# Patient Record
Sex: MALE | Race: WHITE | Employment: OTHER | URBAN - METROPOLITAN AREA
[De-identification: names, ages, dates, MRNs, and addresses within clinical notes are randomized per-mention and may not be internally consistent; named-entity substitution may affect disease eponyms.]

---

## 2017-07-05 ENCOUNTER — OFFICE VISIT (OUTPATIENT)
Dept: FAMILY MEDICINE | Facility: CLINIC | Age: 62
End: 2017-07-05

## 2017-07-05 VITALS
DIASTOLIC BLOOD PRESSURE: 71 MMHG | TEMPERATURE: 97.9 F | HEIGHT: 70 IN | OXYGEN SATURATION: 98 % | SYSTOLIC BLOOD PRESSURE: 134 MMHG | HEART RATE: 71 BPM | WEIGHT: 159.2 LBS | BODY MASS INDEX: 22.79 KG/M2
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
## 2017-07-05 DIAGNOSIS — S40.862A TICK BITE OF LEFT UPPER ARM, INITIAL ENCOUNTER: Primary | ICD-10-CM

## 2017-07-05 PROCEDURE — 36415 COLL VENOUS BLD VENIPUNCTURE: CPT | Performed by: FAMILY MEDICINE

## 2017-07-05 PROCEDURE — 99213 OFFICE O/P EST LOW 20 MIN: CPT | Performed by: FAMILY MEDICINE

## 2017-07-05 PROCEDURE — 86618 LYME DISEASE ANTIBODY: CPT | Performed by: FAMILY MEDICINE

## 2017-07-05 RX ORDER — DOXYCYCLINE 100 MG/1
200 CAPSULE ORAL ONCE
Qty: 2 CAPSULE | Refills: 0 | Status: SHIPPED | OUTPATIENT
Start: 2017-07-05 | End: 2017-07-05

## 2017-07-05 NOTE — PATIENT INSTRUCTIONS
"  Tick Bites  Ticks are insects that feed on the blood of animals and humans. They may gorge themselves for days before you find and remove them. The bites themselves aren't cause for concern. But ticks can carry and transmit illnesses such as Lyme disease and [place]qui[place] spotted fever. Both diseases begin with a rash and symptoms similar to the flu. In advanced stages, these diseases can be quite serious.     A \"bull's eye\" rash is a common symptom of Lyme disease.   When to go to the emergency department (ED)  Not all ticks carry disease. And a tick must remain attached for at least 24 hours to infect you. If you find a tick, don't panic. Try to carefully remove it with tweezers. Grasp the insect near its head and pull without twisting. If you can't easily dislodge the tick or if you leave the head in your skin, get medical care right away.  What to expect in the ED    The tick or any remnants will be removed and the bite cleaned.    To prevent disease, you may be given antibiotics. Both Lyme disease and [place]qui[place] spotted fever respond quickly to these medications.    You may be asked to see your health care provider for a blood test to check for Lyme disease.  Follow-up care  Some states and counties have services that test ticks for the presence of Lyme's disease and other diseases. You may check with your local officials to see if this service is available in your area.  If you remove a tick yourself, watch for signs of a tick-borne illness. Symptoms may appear within a few days or weeks after a bite. Call your health care provider if you notice any of the following:    Rash (This may spread outward in a ring from a hard white lump. Or, it may move up your arms and legs to your chest.)    Chills and fever    Body aches and joint pain    Severe headache  Date Last Reviewed: 12/1/2016 2000-2017 [employer]. [address] [place] [address], [place] [address]. All rights reserved. This information " is not intended as a substitute for professional medical care. Always follow your healthcare professional's instructions.

## 2017-07-05 NOTE — LETTER
[facility] [place]  [address] [facility]  [place] [address]  [phone]        July 11, 2017      Gera Garsia  [address]  [place] [address]        Dear Gera,      Lymes test came back negative.     Results for orders placed or performed in visit on 07/05/17   Lyme Disease Krystin with reflex to WB Serum   Result Value Ref Range    Lyme Disease Antibodies Serum 0.06 0.00 - 0.89       If you have any questions or concerns, please call myself or my nurse at [phone].    Sincerely,      Anel Erickson M.D/karlo

## 2017-07-05 NOTE — NURSING NOTE
"Chief Complaint   Patient presents with     Insect Bites       Initial /71  Pulse 71  Temp 97.9  F (36.6  C) (Tympanic)  Ht 5' 10\" (1.778 m)  Wt 159 lb 3.2 oz (72.2 kg)  SpO2 98%  BMI 22.84 kg/m2 Estimated body mass index is 22.84 kg/(m^2) as calculated from the following:    Height as of this encounter: 5' 10\" (1.778 m).    Weight as of this encounter: 159 lb 3.2 oz (72.2 kg).  Medication Reconciliation: complete     Health Maintenance:  Declined all.  Declined having referral/ order placed for colonoscopy or FIT test.     Lucy Medina MA      "

## 2017-07-05 NOTE — MR AVS SNAPSHOT
"              After Visit Summary   7/5/2017    Gera Garsia    MRN: [id number]           Patient Information     Date Of Birth          1955        Visit Information        Provider Department      7/5/2017 10:00 AM Anel Erickson MD [facility]        Today's Diagnoses     Tick bite of left upper arm, initial encounter    -  1      Care Instructions      Tick Bites  Ticks are insects that feed on the blood of animals and humans. They may gorge themselves for days before you find and remove them. The bites themselves aren't cause for concern. But ticks can carry and transmit illnesses such as Lyme disease and [place]qui[place] spotted fever. Both diseases begin with a rash and symptoms similar to the flu. In advanced stages, these diseases can be quite serious.     A \"bull's eye\" rash is a common symptom of Lyme disease.   When to go to the emergency department (ED)  Not all ticks carry disease. And a tick must remain attached for at least 24 hours to infect you. If you find a tick, don't panic. Try to carefully remove it with tweezers. Grasp the insect near its head and pull without twisting. If you can't easily dislodge the tick or if you leave the head in your skin, get medical care right away.  What to expect in the ED    The tick or any remnants will be removed and the bite cleaned.    To prevent disease, you may be given antibiotics. Both Lyme disease and [place] spotted fever respond quickly to these medications.    You may be asked to see your health care provider for a blood test to check for Lyme disease.  Follow-up care  Some states and [facility] have services that test ticks for the presence of Lyme's disease and other diseases. You may check with your local officials to see if this service is available in your area.  If you remove a tick yourself, watch for signs of a tick-borne illness. Symptoms may appear within a few days or weeks after a bite. Call your health care " "provider if you notice any of the following:    Rash (This may spread outward in a ring from a hard white lump. Or, it may move up your arms and legs to your chest.)    Chills and fever    Body aches and joint pain    Severe headache  Date Last Reviewed: 12/1/2016 2000-2017 [employer]. [address] [place] [address]. All rights reserved. This information is not intended as a substitute for professional medical care. Always follow your healthcare professional's instructions.                Follow-ups after your visit        Follow-up notes from your care team     Return if symptoms worsen or fail to improve.      Who to contact     Normal or non-critical lab and imaging results will be communicated to you by The[employer]orhart, letter or phone within 4 business days after the clinic has received the results. If you do not hear from us within 7 days, please contact the clinic through [employer] or phone. If you have a critical or abnormal lab result, we will notify you by phone as soon as possible.  Submit refill requests through [employer] or call your pharmacy and they will forward the refill request to us. Please allow 3 business days for your refill to be completed.          If you need to speak with a  for additional information , please call: [phone]             Additional Information About Your Visit        [employer] Information     [employer] lets you send messages to your doctor, view your test results, renew your prescriptions, schedule appointments and more. To sign up, go to [email or web address] . Click on \"Log in\" on the left side of the screen, which will take you to the Welcome page. Then click on \"Sign up Now\" on the right side of the page.     You will be asked to enter the access code listed below, as well as some personal information. Please follow the directions to create your username and password.     Your access code is: [email or web address]  Expires: 10/3/2017 10:26 AM   " "  Your access code will  in 90 days. If you need help or a new code, please call your [facility] clinic or [phone].        Care EveryWhere ID     This is your Care EveryWhere ID. This could be used by other organizations to access your [facility] medical records  [id number]        Your Vitals Were     Pulse Temperature Height Pulse Oximetry BMI (Body Mass Index)       71 97.9  F (36.6  C) (Tympanic) 5' 10\" (1.778 m) 98% 22.84 kg/m2        Blood Pressure from Last 3 Encounters:   17 134/71   16 132/62   16 132/81    Weight from Last 3 Encounters:   17 159 lb 3.2 oz (72.2 kg)   16 173 lb (78.5 kg)   16 170 lb 12.8 oz (77.5 kg)              We Performed the Following     Lyme Disease Krystin with reflex to WB Serum          Today's Medication Changes          These changes are accurate as of: 17 10:26 AM.  If you have any questions, ask your nurse or doctor.               Start taking these medicines.        Dose/Directions    doxycycline 100 MG capsule   Commonly known as:  VIBRAMYCIN   Used for:  Tick bite of left upper arm, initial encounter   Started by:  Anel Erickson MD        Dose:  200 mg   Take 2 capsules (200 mg) by mouth once for 1 dose   Quantity:  2 capsule   Refills:  0            Where to get your medicines      These medications were sent to [facility] [id number] [place] [address] [address] [facility] [address]    Hours:  Test fax successful 02   Phone:  [phone]     doxycycline 100 MG capsule                Primary Care Provider Office Phone #    [facility] [phone]       No address on file        Equal Access to Services     ALEXANDR BRIDGES AH: Fran Talavera, wasanjuanada lubarry, qaybta kaalmada riki, keyana valdez. So [facility] [phone].    ATENCIÓN: Si habla español, tiene a ruby disposición servicios gratuitos de " asistencia lingüística. Chip al [phone].    We comply with applicable federal civil rights laws and [place] laws. We do not discriminate on the basis of race, color, national origin, age, disability sex, sexual orientation or gender identity.            Thank you!     Thank you for choosing [facility]  for your care. Our goal is always to provide you with excellent care. Hearing back from our patients is one way we can continue to improve our services. Please take a few minutes to complete the written survey that you may receive in the mail after your visit with us. Thank you!             Your Updated Medication List - Protect others around you: Learn how to safely use, store and throw away your medicines at www.disposemymeds.org.          This list is accurate as of: 7/5/17 10:26 AM.  Always use your most recent med list.                   Brand Name Dispense Instructions for use Diagnosis    doxycycline 100 MG capsule    VIBRAMYCIN    2 capsule    Take 2 capsules (200 mg) by mouth once for 1 dose    Tick bite of left upper arm, initial encounter

## 2017-07-05 NOTE — PROGRESS NOTES
SUBJECTIVE:                                                    Gera Garsia is a 62 year old male who presents to clinic today for the following health issues:      Possible Deer Tick Bite  Red ana on left upper extremity.  Increasing in size since first noticed x6 days ago.       Denies having any symptoms. No fever/chills. No malaise or joint pain.   Concerned about possible lyme disease. States that he [place] in the woods and has a friend that had a similar tick bite and ended up with disseminated lyme disease.     Problem list and histories reviewed & adjusted, as indicated.  Additional history: as documented    Patient Active Problem List   Diagnosis     Colon cancer screening     Hyperlipidemia with target LDL less than 130     Advanced directives, counseling/discussion     Baker's cyst of knee     RLQ abdominal pain     Asplenia     Past Surgical History:   Procedure Laterality Date     ABDOMEN SURGERY      x 2, once as a , and once for spenectomy      ENT SURGERY       ORTHOPEDIC SURGERY      AC joint separation repair- complicated with infection       Social History   Substance Use Topics     Smoking status: Former Smoker     Packs/day: 1.00     Years: 13.00     Quit date: 1984     Smokeless tobacco: Never Used     Alcohol use No      Comment: Formerly beer 5-6 beers/day, quit 10 yrs ago ()     Family History   Problem Relation Age of Onset     Coronary Artery Disease Father 67      from MI, smoker, drinker, obesity     Coronary Artery Disease Paternal Uncle      Hypertension Mother      Hyperlipidemia Father      Hyperlipidemia Mother      Breast Cancer No family hx of      Cancer - colorectal No family hx of      Ovarian Cancer No family hx of      Prostate Cancer No family hx of          Current Outpatient Prescriptions   Medication Sig Dispense Refill     doxycycline (VIBRAMYCIN) 100 MG capsule Take 2 capsules (200 mg) by mouth once for 1 dose 2 capsule 0     No Known  "Allergies    Reviewed and updated as needed this visit by clinical staff  Allergies       Reviewed and updated as needed this visit by Provider         ROS:  Constitutional, HEENT, cardiovascular, pulmonary, gi and gu systems are negative, except as otherwise noted.    OBJECTIVE:     /71  Pulse 71  Temp 97.9  F (36.6  C) (Tympanic)  Ht 5' 10\" (1.778 m)  Wt 159 lb 3.2 oz (72.2 kg)  SpO2 98%  BMI 22.84 kg/m2  Body mass index is 22.84 kg/(m^2).  GENERAL: healthy, alert and no distress  SKIN: Mild erythematous flat circular area measuring about 3 cm without central clearing on the left mid upper arm. No bite marks or edema. Non-tender to palpation     Diagnostic Test Results:  Labs in process    ASSESSMENT/PLAN:     Gera was seen today for insect bites.    Diagnoses and all orders for this visit:    Tick bite of left upper arm, initial encounter; with local reaction  -     Lyme Disease Krystin with reflex to WB Serum  -     doxycycline (VIBRAMYCIN) 100 MG capsule; Take 2 capsules (200 mg) by mouth once for 1 dose       Follow up if symptoms fail to improve or worsen.      The patient was in agreement with the plan today and had no questions or concerns prior to leaving the clinic.        Anel Erickson MD  [facility]          "

## 2017-07-07 LAB — B BURGDOR IGG+IGM SER QL: 0.06 (ref 0–0.89)

## 2017-12-21 ENCOUNTER — OFFICE VISIT (OUTPATIENT)
Dept: URGENT CARE | Facility: URGENT CARE | Age: 62
End: 2017-12-21

## 2017-12-21 ENCOUNTER — NURSE TRIAGE (OUTPATIENT)
Dept: NURSING | Facility: CLINIC | Age: 62
End: 2017-12-21

## 2017-12-21 VITALS
DIASTOLIC BLOOD PRESSURE: 84 MMHG | HEART RATE: 88 BPM | BODY MASS INDEX: 23.59 KG/M2 | OXYGEN SATURATION: 99 % | SYSTOLIC BLOOD PRESSURE: 150 MMHG | TEMPERATURE: 98.6 F | WEIGHT: 164.4 LBS

## 2017-12-21 DIAGNOSIS — H53.8 BLURRY VISION, LEFT EYE: Primary | ICD-10-CM

## 2017-12-21 PROCEDURE — 99213 OFFICE O/P EST LOW 20 MIN: CPT | Performed by: NURSE PRACTITIONER

## 2017-12-21 NOTE — MR AVS SNAPSHOT
After Visit Summary   12/21/2017    Gera Garsia    MRN: [id number]           Patient Information     Date Of Birth          1955        Visit Information        Provider Department      12/21/2017 8:10 PM Roseanna Mart NP [facility]        Today's Diagnoses     Blurry vision, left eye    -  1      Care Instructions      Blurred Vision  Blurred vision is when your vision is no longer sharp and you can t see small details. Any changes in your vision, whether sudden or over time, should be checked out by an eye specialist.  Vision changes can be caused by many things. These include eye diseases, side effects of some medicines, or a condition such as diabetes. Never ignore vision changes. People often think that vision changes occur because they need more powerful vision correction. Many people then delay seeing their healthcare provider about their vision changes. But it s risky to delay care. If left untreated, some eye problems can lead to lasting (permanent) vision loss that can t be corrected with glasses. This can significantly reduce quality of life.  Home care  Make changes in your home to reduce the risk of falling:    Keep walkways clear of objects you may trip over. Use nonslip pads under rugs.    Brighter lighting in your home may help you see better.    Don t walk in poorly lit areas.    Be careful when stepping up and down from curbs and walking on uneven sidewalks.  Follow-up care  Follow up with an eye specialist or as advised. There are two types of eye doctors you can consult:    Optometrist. This is a licensed doctor of optometry. Optometrists are not medical doctors. They specialize in eye exams and may diagnose some eye problems. They also prescribe glasses and contact lenses.    Ophthalmologist. This is a medical doctor who specializes in eye care. Ophthalmologists diagnose and treat all eye diseases, prescribe medicines, and perform eye surgery. They may  also prescribe glasses and contact lenses.  An optometrist can provide a basic screening eye exam for much less cost than an ophthalmologist. The optometrist can tell you if your condition needs the services of an ophthalmologist.  When to seek medical advice  Call your healthcare provider right away if any of these occur:    Sudden change in your vision    Eye pain, redness, or discharge from your eyelid    Blurriness doesn t get better, or it gets worse    Dark spots in your field of vision    Halos around lights    Dots or strings (called floaters) moving across your field of vision    Sudden flash of light inside your eye    Vision dims    Part or full vision loss  Date Last Reviewed: 7/1/2017 2000-2017 [employer]. [address] [place] [address], [place] [address]. All rights reserved. This information is not intended as a substitute for professional medical care. Always follow your healthcare professional's instructions.                Follow-ups after your visit        Additional Services     OPHTHALMOLOGY ADULT REFERRAL       Your provider has referred you to: [facility]: [facility] [phone]   [email or web address]    Please be aware that coverage of these services is subject to the terms and limitations of your health insurance plan.  Call member services at your health plan with any benefit or coverage questions.      Please bring the following with you to your appointment:    (1) Any X-Rays, CTs or MRIs which have been performed.  Contact the facility where they were done to arrange for  prior to your scheduled appointment.    (2) List of current medications  (3) This referral request   (4) Any documents/labs given to you for this referral                  Who to contact     If you have questions or need follow up information about today's clinic visit or your schedule please contact [facility] directly at [phone].  Normal  "or non-critical lab and imaging results will be communicated to you by MyChart, letter or phone within 4 business days after the clinic has received the results. If you do not hear from us within 7 days, please contact the clinic through Copybart or phone. If you have a critical or abnormal lab result, we will notify you by phone as soon as possible.  Submit refill requests through [employer]a or call your pharmacy and they will forward the refill request to us. Please allow 3 business days for your refill to be completed.          Additional Information About Your Visit        [employer] Mobile Guides Information     [employer] lets you send messages to your doctor, view your test results, renew your prescriptions, schedule appointments and more. To sign up, go to [email or web address] . Click on \"Log in\" on the left side of the screen, which will take you to the Welcome page. Then click on \"Sign up Now\" on the right side of the page.     You will be asked to enter the access code listed below, as well as some personal information. Please follow the directions to create your username and password.     Your access code is: [email or web address]  Expires: 3/21/2018  8:40 PM     Your access code will  in 90 days. If you need help or a new code, please call your [facility] clinic or [phone].        Care EveryWhere ID     This is your Care EveryWhere ID. This could be used by other organizations to access your [facility] medical records  [id number]        Your Vitals Were     Pulse Temperature Pulse Oximetry BMI (Body Mass Index)          88 98.6  F (37  C) (Oral) 99% 23.59 kg/m2         Blood Pressure from Last 3 Encounters:   17 150/84   17 134/71   16 132/62    Weight from Last 3 Encounters:   17 164 lb 6.4 oz (74.6 kg)   17 159 lb 3.2 oz (72.2 kg)   16 173 lb (78.5 kg)              We Performed the Following     OPHTHALMOLOGY ADULT REFERRAL        Primary Care Provider Office Phone # Fax #    Sheng " [facility] [phone]       [address] [facility] [address]        Equal Access to Services     ALEXANDR BRIDGES : Hadii aad ku hadalcidespatricio Somoraima, wasanjuanada luqadaha, cathleenta kaalmada riki, keyana martinezjonny valdez. So [facility] [phone].    ATENCIÓN: Si habla español, tiene a ruby disposición servicios gratuitos de asistencia lingüística. Llame al [phone].    We comply with applicable federal civil rights laws and [place] laws. We do not discriminate on the basis of race, color, national origin, age, disability, sex, sexual orientation, or gender identity.            Thank you!     Thank you for choosing [facility]  for your care. Our goal is always to provide you with excellent care. Hearing back from our patients is one way we can continue to improve our services. Please take a few minutes to complete the written survey that you may receive in the mail after your visit with us. Thank you!             Your Updated Medication List - Protect others around you: Learn how to safely use, store and throw away your medicines at www.disposemymeds.org.      Notice  As of 12/21/2017  8:40 PM    You have not been prescribed any medications.

## 2017-12-22 ENCOUNTER — OFFICE VISIT (OUTPATIENT)
Dept: OPHTHALMOLOGY | Facility: CLINIC | Age: 62
End: 2017-12-22

## 2017-12-22 DIAGNOSIS — H43.812 POSTERIOR VITREOUS DETACHMENT OF LEFT EYE: ICD-10-CM

## 2017-12-22 DIAGNOSIS — H43.12 VITREOUS HEMORRHAGE OF LEFT EYE (H): Primary | ICD-10-CM

## 2017-12-22 DIAGNOSIS — H52.4 PRESBYOPIA: ICD-10-CM

## 2017-12-22 PROCEDURE — 92004 COMPRE OPH EXAM NEW PT 1/>: CPT | Performed by: OPHTHALMOLOGY

## 2017-12-22 PROCEDURE — 92015 DETERMINE REFRACTIVE STATE: CPT | Performed by: OPHTHALMOLOGY

## 2017-12-22 RX ORDER — CHLORAL HYDRATE 500 MG
CAPSULE ORAL
COMMUNITY
Start: 2010-09-30

## 2017-12-22 ASSESSMENT — REFRACTION_MANIFEST
OD_ADD: +2.50
OS_CYLINDER: SPHERE
OD_AXIS: 020
OS_ADD: +2.50
OS_SPHERE: -0.50
OD_SPHERE: -1.00
OD_CYLINDER: +0.75

## 2017-12-22 ASSESSMENT — CONF VISUAL FIELD: OD_NORMAL: 1

## 2017-12-22 ASSESSMENT — SLIT LAMP EXAM - LIDS
COMMENTS: 1+ DERMATOCHALASIS, 1+ MEIBOMIAN GLAND DYSFUNCTION
COMMENTS: 1+ DERMATOCHALASIS, 1+ MEIBOMIAN GLAND DYSFUNCTION

## 2017-12-22 ASSESSMENT — TONOMETRY
OS_IOP_MMHG: 16
OD_IOP_MMHG: 16
IOP_METHOD: APPLANATION

## 2017-12-22 ASSESSMENT — VISUAL ACUITY
OD_SC+: -2
METHOD: SNELLEN - LINEAR
OD_SC: 20/30
OD_PH_SC: 20/30-1
OS_SC+: -2
OS_SC: 20/60

## 2017-12-22 ASSESSMENT — CUP TO DISC RATIO
OD_RATIO: 0.6
OS_RATIO: 0.4

## 2017-12-22 NOTE — PROGRESS NOTES
Current Eye Medications:  Art. Tears once yesterday.      Subjective:  Here for blurred vision left eye, started two days ago. Hasn't had a complete exam in 5 years or so.  In Epic, said he was open angle suspect in 2006 at [facility].  He is unaware of this.  Blood pressure was high yesterday when he was seen in Urgent Care.  Noticed as he was sitting watching TV 2 days ago, that the vision left eye got cloudy, then smoke.  A shadow of black that moves around in the center of the vision.      Objective:  See Ophthalmology Exam.       Assessment:  Acute vitreous hemorrhage left eye likely from posterior vitreous detachment.  No retinal tear or detachment at this time.      Plan:  Continue observation  Signs and symptoms of retinal detachment (shower of black floaters, frequent flashing even during day, curtain over part of visual field) discussed.   Possible posterior vitreous detachment (sudden onset large floater and/or flashing lights) discussed. Right eye   Glasses Rx given - optional   Return visit 2 weeks for intraocular pressure check and dilation left eye.    Ajit Aldana M.D.

## 2017-12-22 NOTE — PATIENT INSTRUCTIONS
Continue observation  Signs and symptoms of retinal detachment (shower of black floaters, frequent flashing even during day, curtain over part of visual field) discussed.   Possible posterior vitreous detachment (sudden onset large floater and/or flashing lights) discussed. Right eye   Glasses Rx given - optional   Return visit 2 weeks for intraocular pressure check and dilation left eye.    Ajit Aldana M.D.

## 2017-12-22 NOTE — TELEPHONE ENCOUNTER
"Sudden onset of \"cloudy\" vision in the left eye started yesterday.  No injury to the eye.   Denies pain.  No drainage.  \"It's like it's smokey. A shadow rises up and a black cloud goes with it.\"    Protocol and care advice reviewed.   Patient states understanding of the recommended disposition and will be seen in ER.   Advised to call back if further questions or concerns.     Reason for Disposition    [1] Blurred vision or visual changes AND [2] present now AND [3] sudden onset or new (e.g., minutes, hours, days)  (Exception: previously diagnosed migraine headaches with same symptoms)    Additional Information    Negative: Complete loss of vision in 1 or both eyes    Negative: Severe eye pain    Negative: Severe headache    Negative: Double vision    Protocols used: VISION LOSS OR CHANGE-ADULT-AH    "

## 2017-12-22 NOTE — MR AVS SNAPSHOT
"              After Visit Summary   12/22/2017    Gera Garsia    MRN: [id number]           Patient Information     Date Of Birth          1955        Visit Information        Provider Department      12/22/2017 9:45 AM Ajit Aldana MD [facility]        Today's Diagnoses     Vitreous hemorrhage of left eye (H)    -  1    Posterior vitreous detachment of left eye        Presbyopia          Care Instructions    Continue observation  Signs and symptoms of retinal detachment (shower of black floaters, frequent flashing even during day, curtain over part of visual field) discussed.   Possible posterior vitreous detachment (sudden onset large floater and/or flashing lights) discussed. Right eye   Glasses Rx given - optional   Return visit 2 weeks for intraocular pressure check and dilation left eye.    Ajit Aldana M.D.            Follow-ups after your visit        Who to contact     If you have questions or need follow up information about today's clinic visit or your schedule please contact [facility] directly at [phone].  Normal or non-critical lab and imaging results will be communicated to you by [employer], letter or phone within 4 business days after the clinic has received the results. If you do not hear from us within 7 days, please contact the clinic through [employer] or phone. If you have a critical or abnormal lab result, we will notify you by phone as soon as possible.  Submit refill requests through [email or web address] or call your pharmacy and they will forward the refill request to us. Please allow 3 business days for your refill to be completed.          Additional Information About Your Visit        [employer] Information     [email or web address] lets you send messages to your doctor, view your test results, renew your prescriptions, schedule appointments and more. To sign up, go to [email or web address] . Click on \"Log in\" on the left side of the screen, which will take you to the " "Welcome page. Then click on \"Sign up Now\" on the right side of the page.     You will be asked to enter the access code listed below, as well as some personal information. Please follow the directions to create your username and password.     Your access code is: [email or web address]  Expires: 3/21/2018  8:40 PM     Your access code will  in 90 days. If you need help or a new code, please call your [place] clinic or [phone].        Care EveryWhere ID     This is your Care EveryWhere ID. This could be used by other organizations to access your [place] medical records  [id number]         Blood Pressure from Last 3 Encounters:   17 150/84   17 134/71   16 132/62    Weight from Last 3 Encounters:   17 74.6 kg (164 lb 6.4 oz)   17 72.2 kg (159 lb 3.2 oz)   16 78.5 kg (173 lb)              We Performed the Following     EYE EXAM (SIMPLE-NONBILLABLE)     REFRACTIVE STATUS        Primary Care Provider Office Phone # Fax #    [facility] [phone]       [address] [facility] [address]        Equal Access to Services     ALEXANDR BRIDGES : Hadii aad ku hadasho Soomaali, waaxda luqadaha, qaybta kaalmada adeegyada, waxay idiin haysathishn guy valdez. So [facility] [phone].    ATENCIÓN: Si habla español, tiene a ruby disposición servicios gratuitos de asistencia lingüística. Llame al [phone].    We comply with applicable federal civil rights laws and [place] laws. We do not discriminate on the basis of race, color, national origin, age, disability, sex, sexual orientation, or gender identity.            Thank you!     Thank you for choosing [facility]  for your care. Our goal is always to provide you with excellent care. Hearing back from our patients is one way we can continue to improve our services. Please take a few minutes to complete the written survey that you may receive in the mail after your visit with us. Thank you!      "        Your Updated Medication List - Protect others around you: Learn how to safely use, store and throw away your medicines at www.disposemymeds.org.          This list is accurate as of: 12/22/17 11:25 AM.  Always use your most recent med list.                   Brand Name Dispense Instructions for use Diagnosis    fish oil-omega-3 fatty acids 1000 MG capsule

## 2017-12-22 NOTE — PROGRESS NOTES
.  SUBJECTIVE:   Gera Garsia is a 62 year old male presenting with a chief complaint of   Chief Complaint   Patient presents with     Eye Problem     Patient complains of not seeing out of left eye   .    Onset of symptoms was 1 day(s) ago.  Course of illness is worsening.    Severity moderate  Current and Associated symptoms: blurry in left eye, unable to focus eyes  Treatment measures tried include eye drops.  Predisposing factors include .        ROS   REVIEW OF SYSTEMS:   General: Negive for fatigue   EYES: Positive for vision changes or eye problems   ENT: Negative for problems with ears, nose or throat. No difficulty swallowing.   RESP: Negative for coughing, wheezing or shortness of breath   CV: Negative for  chest pains or palpitations   GI: Negative for  nausea, vomiting, heartburn, abdominal pain, diarrhea, constipation or change in bowel habits   : Negative for  urinary frequency or dysuria, bladder or kidney problems   MUSCULOSKELETAL: Negative for significant muscle or joint pains   NEUROLOGIC: Negative for  headaches, numbness, tingling, weakness, problems with balance or coordination   PSYCHIATRIC: Negative for anxiety, depression or mental health   HEME/IMMUNE/ALLERGY: Negative for  history of bleeding or clotting problems or anemia. No allergies or immune system problems   ENDOCRINE: Negative for history of thyroid disease, diabetes or other endocrine disorders   SKIN: Negative for  rashes,worrisome lesions or skin problems    Past Medical History:   Diagnosis Date     Post-splenectomy     s/p trauma     Problems with hearing     bilat hearing aids     No current outpatient prescriptions on file.     Social History   Substance Use Topics     Smoking status: Former Smoker     Packs/day: 1.00     Years: 13.00     Quit date: 1/1/1984     Smokeless tobacco: Never Used     Alcohol use No      Comment: Formerly beer 5-6 beers/day, quit 10 yrs ago (2004)       OBJECTIVE  /84 (BP Location: Left  arm, Patient Position: Chair, Cuff Size: Adult Regular)  Pulse 88  Temp 98.6  F (37  C) (Oral)  Wt 164 lb 6.4 oz (74.6 kg)  SpO2 99%  BMI 23.59 kg/m2   Right 20/40, left not able to see  Physical Exam   Physical exam  Constitutional: healthy, alert and no distress  Head: Normocephalic. No masses, lesions, tenderness or abnormalities  ENT: ENT exam normal, no neck nodes or sinus tenderness  Cardiovascular: Rate normal, PMI normal. No lifts, heaves, or thrills. RRR. No murmurs, clicks gallops or rub  Respiratory: negative, Percussion normal. Good diaphragmatic excursion. Lungs clear  Gastrointestinal: Abdomen soft, non-tender. BS normal. No masses, organomegaly  : Deferred  Musculoskeletal: extremities normal- no gross deformities noted, gait normal and normal muscle tone  Skin: no suspicious lesions or rashes  Neurologic: Gait normal. Reflexes normal and symmetric. Sensation grossly WNL.  Psychiatric: mentation appears normal and affect normal/bright  Hematologic/Lymphatic/Immunologic: Normal cervical lymph nodes    ASSESSMENT:    ICD-10-CM    1. Blurry vision, left eye H53.8 OPHTHALMOLOGY ADULT REFERRAL       PLAN:  referral is made to immediately to see an ophthalmologist  Patient educational/instructional material provided including reasons for follow-up    The patient indicates understanding of these issues and agrees with the plan.  Roseanna Mart  [facility]  Family Nurse Practitoner

## 2017-12-22 NOTE — NURSING NOTE
"Chief Complaint   Patient presents with     Eye Problem     Patient complains of not seeing out of left eye       Initial /84 (BP Location: Left arm, Patient Position: Chair, Cuff Size: Adult Regular)  Pulse 88  Temp 98.6  F (37  C) (Oral)  Wt 164 lb 6.4 oz (74.6 kg)  SpO2 99%  BMI 23.59 kg/m2 Estimated body mass index is 23.59 kg/(m^2) as calculated from the following:    Height as of 7/5/17: 5' 10\" (1.778 m).    Weight as of this encounter: 164 lb 6.4 oz (74.6 kg).  Medication Reconciliation: complete       Tina Koch    "

## 2017-12-22 NOTE — PATIENT INSTRUCTIONS
Blurred Vision  Blurred vision is when your vision is no longer sharp and you can t see small details. Any changes in your vision, whether sudden or over time, should be checked out by an eye specialist.  Vision changes can be caused by many things. These include eye diseases, side effects of some medicines, or a condition such as diabetes. Never ignore vision changes. People often think that vision changes occur because they need more powerful vision correction. Many people then delay seeing their healthcare provider about their vision changes. But it s risky to delay care. If left untreated, some eye problems can lead to lasting (permanent) vision loss that can t be corrected with glasses. This can significantly reduce quality of life.  Home care  Make changes in your home to reduce the risk of falling:    Keep walkways clear of objects you may trip over. Use nonslip pads under rugs.    Brighter lighting in your home may help you see better.    Don t walk in poorly lit areas.    Be careful when stepping up and down from curbs and walking on uneven sidewalks.  Follow-up care  Follow up with an eye specialist or as advised. There are two types of eye doctors you can consult:    Optometrist. This is a licensed doctor of optometry. Optometrists are not medical doctors. They specialize in eye exams and may diagnose some eye problems. They also prescribe glasses and contact lenses.    Ophthalmologist. This is a medical doctor who specializes in eye care. Ophthalmologists diagnose and treat all eye diseases, prescribe medicines, and perform eye surgery. They may also prescribe glasses and contact lenses.  An optometrist can provide a basic screening eye exam for much less cost than an ophthalmologist. The optometrist can tell you if your condition needs the services of an ophthalmologist.  When to seek medical advice  Call your healthcare provider right away if any of these occur:    Sudden change in your vision    Eye  pain, redness, or discharge from your eyelid    Blurriness doesn t get better, or it gets worse    Dark spots in your field of vision    Halos around lights    Dots or strings (called floaters) moving across your field of vision    Sudden flash of light inside your eye    Vision dims    Part or full vision loss  Date Last Reviewed: 7/1/2017 2000-2017 [employer]. [address] [place] [address]. All rights reserved. This information is not intended as a substitute for professional medical care. Always follow your healthcare professional's instructions.

## 2018-01-05 ENCOUNTER — OFFICE VISIT (OUTPATIENT)
Dept: OPHTHALMOLOGY | Facility: CLINIC | Age: 63
End: 2018-01-05

## 2018-01-05 DIAGNOSIS — H43.812 POSTERIOR VITREOUS DETACHMENT OF LEFT EYE: ICD-10-CM

## 2018-01-05 DIAGNOSIS — H35.52 MACULAR PIGMENT DEPOSIT: ICD-10-CM

## 2018-01-05 DIAGNOSIS — H43.12 VITREOUS HEMORRHAGE OF LEFT EYE (H): Primary | ICD-10-CM

## 2018-01-05 PROCEDURE — 92014 COMPRE OPH EXAM EST PT 1/>: CPT | Performed by: OPHTHALMOLOGY

## 2018-01-05 ASSESSMENT — VISUAL ACUITY
METHOD: SNELLEN - LINEAR
OD_PH_SC: 20/20
OD_SC: 20/40
CORRECTION_TYPE: GLASSES
OS_SC: 20/25+3

## 2018-01-05 ASSESSMENT — TONOMETRY
OD_IOP_MMHG: 15
OS_IOP_MMHG: 15
IOP_METHOD: APPLANATION

## 2018-01-05 ASSESSMENT — CUP TO DISC RATIO: OS_RATIO: 0.4

## 2018-01-05 NOTE — PATIENT INSTRUCTIONS
Continue observation.  Signs and symptoms of retinal detachment (shower of black floaters, frequent flashing even during day, curtain over part of visual field) discussed.   Return visit 1 month for intraocular pressure check and dilation left eye.    Ajit Aldana M.D.

## 2018-01-05 NOTE — PROGRESS NOTES
Current Eye Medications:       Subjective:  IOP and dilate/ 2 wk recheck vitreous hemorrhage left eye  Pt reports continues to see a black cloud in vision left eye, pt reports no improvement for his point of view.     Objective:  See Ophthalmology Exam.       Assessment:  Posterior vitreous detachment with vitreous hemorrhage left eye.      Plan:  Continue observation.  Signs and symptoms of retinal detachment (shower of black floaters, frequent flashing even during day, curtain over part of visual field) discussed.   Return visit 1 month for intraocular pressure check and dilation left eye.    Ajit Aldana M.D.

## 2018-01-05 NOTE — MR AVS SNAPSHOT
"              After Visit Summary   1/5/2018    Gera Garsia    MRN: [id number]           Patient Information     Date Of Birth          1955        Visit Information        Provider Department      1/5/2018 9:45 AM Ajit Aldana MD [facility]        Today's Diagnoses     Vitreous hemorrhage of left eye (H)    -  1    Posterior vitreous detachment of left eye          Care Instructions    Continue observation.  Signs and symptoms of retinal detachment (shower of black floaters, frequent flashing even during day, curtain over part of visual field) discussed.   Return visit 1 month for intraocular pressure check and dilation left eye.    Ajit Aldana M.D.            Follow-ups after your visit        Who to contact     If you have questions or need follow up information about today's clinic visit or your schedule please contact [facility] directly at [phone].  Normal or non-critical lab and imaging results will be communicated to you by MyChart, letter or phone within 4 business days after the clinic has received the results. If you do not hear from us within 7 days, please contact the clinic through [employer] or phone. If you have a critical or abnormal lab result, we will notify you by phone as soon as possible.  Submit refill requests through [email or web address] or call your pharmacy and they will forward the refill request to us. Please allow 3 business days for your refill to be completed.          Additional Information About Your Visit        MyChart Information     [email or web address] lets you send messages to your doctor, view your test results, renew your prescriptions, schedule appointments and more. To sign up, go to [email or web address] . Click on \"Log in\" on the left side of the screen, which will take you to the Welcome page. Then click on \"Sign up Now\" on the right side of the page.     You will be asked to enter the access code listed below, as well as some personal " information. Please follow the directions to create your username and password.     Your access code is: [email or web address]  Expires: 3/21/2018  8:40 PM     Your access code will  in 90 days. If you need help or a new code, please call your [place] clinic or [phone].        Care EveryWhere ID     This is your Care EveryWhere ID. This could be used by other organizations to access your [place] medical records  [id number]         Blood Pressure from Last 3 Encounters:   17 150/84   17 134/71   16 132/62    Weight from Last 3 Encounters:   17 74.6 kg (164 lb 6.4 oz)   17 72.2 kg (159 lb 3.2 oz)   16 78.5 kg (173 lb)              Today, you had the following     No orders found for display       Primary Care Provider Office Phone # Fax #    [facility] [phone]       [address] [facility] [address]        Equal Access to Services     [facility]: Hadii aad ku hadasho Soomaali, waaxda luqadaha, qaybta kaalmada adeegyada, waxay hermelinda cage . So [facility] [phone].    ATENCIÓN: Si habla español, tiene a ruyb disposición servicios gratuitos de asistencia lingüística. Chip al [phone].    We comply with applicable federal civil rights laws and [place] laws. We do not discriminate on the basis of race, color, national origin, age, disability, sex, sexual orientation, or gender identity.            Thank you!     Thank you for choosing [facility]  for your care. Our goal is always to provide you with excellent care. Hearing back from our patients is one way we can continue to improve our services. Please take a few minutes to complete the written survey that you may receive in the mail after your visit with us. Thank you!             Your Updated Medication List - Protect others around you: Learn how to safely use, store and throw away your medicines at www.disposemymeds.org.          This list is  accurate as of: 1/5/18 10:51 AM.  Always use your most recent med list.                   Brand Name Dispense Instructions for use Diagnosis    fish oil-omega-3 fatty acids 1000 MG capsule

## 2018-01-06 PROBLEM — H35.52 MACULAR PIGMENT DEPOSIT: Status: ACTIVE | Noted: 2018-01-06

## 2019-03-18 ENCOUNTER — DOCUMENTATION ONLY (OUTPATIENT)
Dept: LAB | Facility: CLINIC | Age: 64
End: 2019-03-18

## 2019-03-18 DIAGNOSIS — Z12.5 SCREENING PSA (PROSTATE SPECIFIC ANTIGEN): ICD-10-CM

## 2019-03-18 DIAGNOSIS — Z13.1 SCREENING FOR DIABETES MELLITUS: ICD-10-CM

## 2019-03-18 DIAGNOSIS — Z11.59 ENCOUNTER FOR HEPATITIS C SCREENING TEST FOR LOW RISK PATIENT: Primary | ICD-10-CM

## 2019-03-18 DIAGNOSIS — Z13.6 CARDIOVASCULAR SCREENING; LDL GOAL LESS THAN 160: ICD-10-CM

## 2019-03-18 NOTE — PROGRESS NOTES
Patient is scheduled for a Lab appointment on 3/21 for PV labs.  Please sign pending orders and add any other tests needed. If the labs are not needed, please follow up with the patient.    Thank you,   Ariana Piedra/Carissa)

## 2019-03-19 ENCOUNTER — OFFICE VISIT (OUTPATIENT)
Dept: ORTHOPEDICS | Facility: CLINIC | Age: 64
End: 2019-03-19
Payer: COMMERCIAL

## 2019-03-19 ENCOUNTER — ANCILLARY PROCEDURE (OUTPATIENT)
Dept: GENERAL RADIOLOGY | Facility: CLINIC | Age: 64
End: 2019-03-19
Attending: PEDIATRICS
Payer: COMMERCIAL

## 2019-03-19 VITALS
WEIGHT: 158 LBS | DIASTOLIC BLOOD PRESSURE: 80 MMHG | HEIGHT: 70 IN | BODY MASS INDEX: 22.62 KG/M2 | SYSTOLIC BLOOD PRESSURE: 144 MMHG

## 2019-03-19 DIAGNOSIS — G89.29 CHRONIC PAIN OF BOTH KNEES: ICD-10-CM

## 2019-03-19 DIAGNOSIS — M25.561 CHRONIC PAIN OF BOTH KNEES: ICD-10-CM

## 2019-03-19 DIAGNOSIS — M25.562 CHRONIC PAIN OF BOTH KNEES: ICD-10-CM

## 2019-03-19 DIAGNOSIS — M17.0 PRIMARY OSTEOARTHRITIS OF BOTH KNEES: Primary | ICD-10-CM

## 2019-03-19 PROCEDURE — 99204 OFFICE O/P NEW MOD 45 MIN: CPT | Performed by: PEDIATRICS

## 2019-03-19 ASSESSMENT — MIFFLIN-ST. JEOR: SCORE: 1507.93

## 2019-03-19 NOTE — PROGRESS NOTES
Sports Medicine Clinic Visit    PCP: Sheng Bright GREG Garsia is a 64 year old male who is seen  as a self referral presenting with bilateral knee pain.   Pain has been present for over 5 years.  Has increased over time.  Feels swelling over the posterior aspect of his right knee.     He has had an MRI of the right knee in the past.   No known injury.    Does take a joint supplement.     **    He feels pain over the front of the knees. He also has swelling of the right posterior knee. The right knee is normally more sore than the left so he typically favors his left lower extremity. He often takes Aleve. Gera has also been taking a supplement for joints Mdrive for joints. Application of heat has been ineffective.    Symptoms appear to improve when he has bicycled for a few miles, loosening up the knees. He has also purchased in-line skates, although he has not used them yet. A stretchy sleeve placed over the knee has also provided some relief. Yoga has also helped.    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    Past Medical History:   Diagnosis Date     Arthritis      Post-splenectomy     s/p trauma     Problems with hearing     bilat hearing  aids     Past Surgical History:   Procedure Laterality Date     ABDOMEN SURGERY      x 2, once as a , and once for spenectomy      ENT SURGERY       ORTHOPEDIC SURGERY      AC joint separation repair- complicated with infection     Family History   Problem Relation Age of Onset     Coronary Artery Disease Father 67         from MI, smoker, drinker, obesity     Hyperlipidemia Father      Coronary Artery Disease Paternal Uncle      Hypertension Mother      Hyperlipidemia Mother      Breast Cancer No family hx of      Cancer - colorectal No family hx of      Ovarian Cancer No family hx of      Prostate Cancer No family hx of      Social History     Socioeconomic History     Marital status: Single     Spouse name: Not on file     Number of children: Not on file     Years of education: Not on file     Highest education level: Not on file   Occupational History     Not on file   Social Needs     Financial resource strain: Not on file     Food insecurity:     Worry: Not on file     Inability: Not on file     Transportation needs:     Medical: Not on file     Non-medical: Not on file   Tobacco Use     Smoking status: Former Smoker     Packs/day: 1.00     Years: 13.00     Pack years: 13.00     Last attempt to quit: 1984     Years since quittin.2     Smokeless tobacco: Never Used   Substance and Sexual Activity     Alcohol use: No     Comment: Formerly beer 5-6 beers/day, quit 10 yrs ago ()     Drug use: No     Sexual activity: Yes     Partners: Female     Comment: vasectomy   Lifestyle     Physical activity:     Days per week: Not on file     Minutes per session: Not on file     Stress: Not on file   Relationships     Social connections:     Talks on phone: Not on file     Gets together: Not on file     Attends Jehovah's witness service: Not on file     Active member of club or organization: Not on file     Attends meetings of clubs or organizations: Not on file     Relationship status: Not on file      "Intimate partner violence:     Fear of current or ex partner: Not on file     Emotionally abused: Not on file     Physically abused: Not on file     Forced sexual activity: Not on file   Other Topics Concern     Parent/sibling w/ CABG, MI or angioplasty before 65F 55M? Not Asked   Social History Narrative    Formerly worked in [employer].  Works as a .  Has common law partner of 25 years     This document serves as a record of the services and decisions personally performed and made by Billy Ramon DO and was created by Rambo Gomez, a trained medical scribe, based on personal observations and provider statements to the medical scribe.  March 19, 2019 9:33 AM   Rambo Gomez    Objective  /80   Ht 1.778 m (5' 10\")   Wt 71.7 kg (158 lb)   BMI 22.67 kg/m      GENERAL APPEARANCE: healthy, alert and no distress   GAIT: antalgic  SKIN: no suspicious rashes.  NEURO: Normal strength and tone, mentation intact and speech normal  PSYCH:  mentation appears normal and affect normal/bright  HEENT: no scleral icterus  CV: no significant extremity edema, does have varicose veins  RESP: nonlabored breathing    Bilateral Knee exam    ROM:        Range of motion limited by pain on right  Lacks a few deg extension right compared to left  Flexion to ~100 deg right    Inspection:       + swelling of bilateral knees, R>L;   Right posterior knee: large area swelling, most consistent with popliteal or Baker's cyst  Appearance of chronic degenerative change    Skin:       well perfused       capillary refill brisk      Tender:        Over area of swelling right posterior knee      Radiology  Visualized radiographs of XR Knee Bilateral 3 Views taken today 3/19/19, and reviewed the images with the patient.  Impression: Bilateral PA, bilateral sunrise, and bilateral lateral views of the knees demonstrate severe medial compartment narrowing bilaterally. The lateral compartments appear fairly well preserved. It is " difficult to assess the patellofemoral compartment with technique, but it appears that space is also preserved. No acute osseous abnormality identified.    Previous 4/28/15 MRI reviewed. Large popliteal cyst noted.    MRI OF THE RIGHT KNEE WITHOUT AND WITH CONTRAST April 28, 2015 7:17 PM     HISTORY: Two years of a posterior knee mass and intermittent pain.  Baker's cyst.     COMPARISON:  None.     TECHNIQUE: Transverse T2 fat-suppressed, coronal T1 and T2 fat-suppressed, and sagittal proton density and T2-weighted images were obtained through the right knee. Additional sagittal, transverse, and coronal T1 fat-suppressed images were obtained following the uneventful intravenous administration of 7.5 mL of Gadavist contrast.     FINDINGS:     Medial meniscus: There is prominent blunting of the free edge. In the absence of surgery this likely represents maceration and tearing of the free edge. No displaced meniscal fragment is seen.     Lateral meniscus: No tear identified.     Anterior cruciate ligament: Intact.     Posterior cruciate ligament: Intact.     Medial collateral ligament: Intact.     Lateral collateral ligament complex: Intact.     Osseous structures and cartilaginous surfaces: No fracture or osseous lesion is demonstrated. There is full-thickness cartilage loss throughout the entire medial compartment with minimal underlying marrow edema and subchondral cyst formation in the posterior aspect of the medial femoral condyle. There are moderate marginal osteophytes. The remaining cartilage surfaces are well preserved and no other  abnormal marrow signal intensity is identified.     Additional findings: There is a moderate size joint effusion. No  intra-articular loose body is seen. No patellar subluxation is  identified. The patellar and visualized distal quadriceps tendons are normal. There is a large bilobed Baker's cyst. This measures up to 9.1 cm craniocaudal by 6.2 cm transverse by up to 2.7 cm AP.  There are a few small varicose veins in the subcutaneous tissues medial to the knee. No other soft tissue abnormality is identified.     IMPRESSION:    1. Marked blunting of the free edge of the medial meniscus. In the absence of surgery this likely represents maceration and tearing of the free edge.  2. Advanced degenerative changes of the medial compartment.  3. Moderate size joint effusion and a large Baker's cyst.      IFTIKHAR JAMISON MD    Assessment:  1. Primary osteoarthritis of both knees    2. Chronic pain of both knees      Plan:  Discussed the assessment with the patient. Bilateral knee OA, clinically with large right popliteal cyst.  Discussed nature of degenerative arthrosis of the knee. Discussed symptom treatment with over-the-counter medications, ice or heat, topical treatments, and rest if needed. Discussed use of compression or bracing for comfort. Discussed potential benefits of rehabilitation, to maintain or improve function at the knee. Discussed benefits of exercise and weight loss (if applicable) to reduce pressure at the knee. Discussed injection therapy. Also briefly discussed future consideration of referral to orthopedic surgery for further evaluation and discussion of additional treatment options.    Radiologic images reviewed and discussed with patient today  We discussed the following: symptom treatment, activity modification/rest, imaging, rehab, injection therapy, medication, support for the affected area and referral to orthopedic surgery. Following discussion, plan:    Topical Treatments: Ice prn  May Elevate prn if desired  Over the counter medication: discussed.  Discussed use of compression for comfort, if desired.  Activity modification: Discussed what to alter/avoid. Ok to try to remain active as able. Ok for biking, skating, if comfortable.  Discussed treatment options for Baker's cyst of the right knee including observation/monitoring, referral for ultrasound-guided  aspiration, risks and benefits of procedure, and chance of recurrence after treatment. Patient desired to proceed. Referred to one of my colleagues for use of US guidance with aspiration.  Discussed injection therapy.  He is interested in a bilateral knee steroid injection, referred to 1 of my colleagues for use of ultrasound guidance for injection placement for popliteal cyst aspiration, and then can do knee joint injections.  Offered PT to maintain ROM and strength, declined, not curative  Future considerations: Rehab: physical therapy - discussed but referral not placed, declined; Referral to orthopedic surgeon for further discussion of other options.  Follow up: Will leave open-ended.  If not improving with injection, considerations may include referral.    Questions answered. The patient indicates understanding of these issues and agrees with the plan.    Billy Ramon DO, CAQ      Patient Instructions   Gera to follow up with Primary Care provider regarding elevated blood pressure.    You may take OTC naproxen (Aleve) 440mg (2 capsules) two times a day with food (maximum of 880 mg/day). Otherwise, use the dose as noted on the bottle.  - and -  You may take OTC acetaminophen (Tylenol) 1000 mg every 6 hours with food (Maximum of 3000mg/day).    You may continue with your supplement.    Will set up with one of my colleagues for aspiration and injection.    Future considerations: physical therapy, other types of bracing, also referral to orthopedic surgeon.        Disclaimer: This note consists of symbols derived from keyboarding, dictation and/or voice recognition software. As a result, there may be errors in the script that have gone undetected. Please consider this when interpreting information found in this chart.    The information in this document, created by the medical scribe for me, accurately reflects the services I personally performed and the decisions made by me. I have reviewed and approved  this document for accuracy prior to leaving the patient care area.  March 19, 2019 10:11 AM  Billy Ramon DO  [facility]

## 2019-03-19 NOTE — LETTER
3/19/2019         RE: Gera Garsia  [address] [facility]  [place] [address]        Dear Colleague,    Thank you for referring your patient, Gera Garsia, to the [facility]. Please see a copy of my visit note below.    Sports Medicine Clinic Visit    PCP: Deangelo, Sheng Bazan    Gera Garsia is a 64 year old male who is seen  as a self referral presenting with bilateral knee pain.   Pain has been present for over 5 years.  Has increased over time.  Feels swelling over the posterior aspect of his right knee.     He has had an MRI of the right knee in the past.   No known injury.    Does take a joint supplement.     **    He feels pain over the front of the knees. He also has swelling of the right posterior knee. The right knee is normally more sore than the left so he typically favors his left lower extremity. He often takes Aleve. Gera has also been taking a supplement for joints Mdrive for joints. Application of heat has been ineffective.    Symptoms appear to improve when he has bicycled for a few miles, loosening up the knees. He has also purchased in-line skates, although he has not used them yet. A stretchy sleeve placed over the knee has also provided some relief. Yoga has also helped.    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    Past Medical History:   Diagnosis Date     Arthritis      Post-splenectomy     s/p trauma     Problems with hearing     bilat hearing aids     Past Surgical History:   Procedure Laterality Date     ABDOMEN SURGERY      x 2, once as a , and once for spenectomy      ENT SURGERY       ORTHOPEDIC SURGERY      AC joint separation repair- complicated with infection     Family History   Problem Relation Age of Onset     Coronary Artery Disease Father 67         from MI, smoker, drinker, obesity     Hyperlipidemia Father      Coronary Artery Disease Paternal Uncle      Hypertension Mother      Hyperlipidemia Mother      Breast Cancer No family hx of      Cancer - colorectal No family hx of      Ovarian Cancer No family hx of      Prostate Cancer No family hx of      Social History     Socioeconomic History     Marital status: Single     Spouse name: Not on file     Number of children: Not on file     Years of education: Not on file     Highest education level: Not on file   Occupational History     Not on file   Social Needs     Financial resource strain: Not on file     Food insecurity:     Worry: Not on file     Inability: Not on file     Transportation needs:     Medical: Not on file     Non-medical: Not on file   Tobacco Use     Smoking status: Former Smoker     Packs/day: 1.00     Years: 13.00     Pack years: 13.00     Last attempt to quit: 1984     Years since quittin.2     Smokeless tobacco: Never Used   Substance and Sexual Activity     Alcohol use: No     Comment: Formerly beer 5-6 beers/day, quit 10 yrs ago ()     Drug use: No     Sexual activity: Yes     Partners: Female     Comment: vasectomy   Lifestyle     Physical activity:     Days per week: Not on file     Minutes per session: Not on file     Stress: Not on file   Relationships     Social connections:     Talks on  "phone: Not on file     Gets together: Not on file     Attends Orthodoxy service: Not on file     Active member of club or organization: Not on file     Attends meetings of clubs or organizations: Not on file     Relationship status: Not on file     Intimate partner violence:     Fear of current or ex partner: Not on file     Emotionally abused: Not on file     Physically abused: Not on file     Forced sexual activity: Not on file   Other Topics Concern     Parent/sibling w/ CABG, MI or angioplasty before 65F 55M? Not Asked   Social History Narrative    Formerly worked in [employer].  Works as a .  Has common law partner of 25 years     This document serves as a record of the services and decisions personally performed and made by Billy Ramon DO and was created by Rambo Gomez, a trained medical scribe, based on personal observations and provider statements to the medical scribe.  March 19, 2019 9:33 AM   Rambo Gomez    Objective  /80   Ht 1.778 m (5' 10\")   Wt 71.7 kg (158 lb)   BMI 22.67 kg/m       GENERAL APPEARANCE: healthy, alert and no distress   GAIT: antalgic  SKIN: no suspicious rashes.  NEURO: Normal strength and tone, mentation intact and speech normal  PSYCH:  mentation appears normal and affect normal/bright  HEENT: no scleral icterus  CV: no significant extremity edema, does have varicose veins  RESP: nonlabored breathing    Bilateral Knee exam    ROM:        Range of motion limited by pain on right  Lacks a few deg extension right compared to left  Flexion to ~100 deg right    Inspection:       + swelling of bilateral knees, R>L;   Right posterior knee: large area swelling, most consistent with popliteal or Baker's cyst  Appearance of chronic degenerative change    Skin:       well perfused       capillary refill brisk      Tender:        Over area of swelling right posterior knee      Radiology  Visualized radiographs of XR Knee Bilateral 3 Views taken today 3/19/19, and " reviewed the images with the patient.  Impression: Bilateral PA, bilateral sunrise, and bilateral lateral views of the knees demonstrate severe medial compartment narrowing bilaterally. The lateral compartments appear fairly well preserved. It is difficult to assess the patellofemoral compartment with technique, but it appears that space is also preserved. No acute osseous abnormality identified.    Previous 4/28/15 MRI reviewed. Large popliteal cyst noted.    MRI OF THE RIGHT KNEE WITHOUT AND WITH CONTRAST April 28, 2015 7:17 PM     HISTORY: Two years of a posterior knee mass and intermittent pain.  Baker's cyst.     COMPARISON:  None.     TECHNIQUE: Transverse T2 fat-suppressed, coronal T1 and T2 fat-suppressed, and sagittal proton density and T2-weighted images were obtained through the right knee. Additional sagittal, transverse, and coronal T1 fat-suppressed images were obtained following the uneventful intravenous administration of 7.5 mL of Gadavist contrast.     FINDINGS:     Medial meniscus: There is prominent blunting of the free edge. In the absence of surgery this likely represents maceration and tearing of the free edge. No displaced meniscal fragment is seen.     Lateral meniscus: No tear identified.     Anterior cruciate ligament: Intact.     Posterior cruciate ligament: Intact.     Medial collateral ligament: Intact.     Lateral collateral ligament complex: Intact.     Osseous structures and cartilaginous surfaces: No fracture or osseous lesion is demonstrated. There is full-thickness cartilage loss throughout the entire medial compartment with minimal underlying marrow edema and subchondral cyst formation in the posterior aspect of the medial femoral condyle. There are moderate marginal osteophytes. The remaining cartilage surfaces are well preserved and no other  abnormal marrow signal intensity is identified.     Additional findings: There is a moderate size joint effusion. No  intra-articular  loose body is seen. No patellar subluxation is  identified. The patellar and visualized distal quadriceps tendons are normal. There is a large bilobed Baker's cyst. This measures up to 9.1 cm craniocaudal by 6.2 cm transverse by up to 2.7 cm AP. There are a few small varicose veins in the subcutaneous tissues medial to the knee. No other soft tissue abnormality is identified.     IMPRESSION:    1. Marked blunting of the free edge of the medial meniscus. In the absence of surgery this likely represents maceration and tearing of the free edge.  2. Advanced degenerative changes of the medial compartment.  3. Moderate size joint effusion and a large Baker's cyst.      IFTIKHAR JAMISON MD    Assessment:  1. Primary osteoarthritis of both knees    2. Chronic pain of both knees      Plan:  Discussed the assessment with the patient. Bilateral knee OA, clinically with large right popliteal cyst.  Discussed nature of degenerative arthrosis of the knee. Discussed symptom treatment with over-the-counter medications, ice or heat, topical treatments, and rest if needed. Discussed use of compression or bracing for comfort. Discussed potential benefits of rehabilitation, to maintain or improve function at the knee. Discussed benefits of exercise and weight loss (if applicable) to reduce pressure at the knee. Discussed injection therapy. Also briefly discussed future consideration of referral to orthopedic surgery for further evaluation and discussion of additional treatment options.    Radiologic images reviewed and discussed with patient today  We discussed the following: symptom treatment, activity modification/rest, imaging, rehab, injection therapy, medication, support for the affected area and referral to orthopedic surgery. Following discussion, plan:    Topical Treatments: Ice prn  May Elevate prn if desired  Over the counter medication: discussed.  Discussed use of compression for comfort, if desired.  Activity  modification: Discussed what to alter/avoid. Ok to try to remain active as able. Ok for biking, skating, if comfortable.  Discussed treatment options for Baker's cyst of the right knee including observation/monitoring, referral for ultrasound-guided aspiration, risks and benefits of procedure, and chance of recurrence after treatment. Patient desired to proceed. Referred to one of my colleagues for use of US guidance with aspiration.  Discussed injection therapy.   He is interested in a bilateral knee steroid injection, referred to 1 of my colleagues for use of ultrasound guidance for injection placement for popliteal cyst aspiration, and then can do knee joint injections.  Offered PT to maintain ROM and strength, declined, not curative  Future considerations: Rehab: physical therapy -  discussed but referral not placed, declined; Referral to orthopedic surgeon for further discussion of other options.  Follow up: Will leave open-ended.  If not improving with injection, considerations may include referral.    Questions answered. The patient indicates understanding of these issues and agrees with the plan.    Billy Ramon DO, CAQ      Patient Instructions   Gera to follow up with Primary Care provider regarding elevated blood pressure.    You may take OTC naproxen (Aleve) 440mg (2 capsules) two times a day with food (maximum of 880 mg/day). Otherwise, use the dose as noted on the bottle.  - and -  You may take OTC acetaminophen (Tylenol) 1000 mg every 6 hours with food (Maximum of 3000mg/day).    You may continue with your supplement.    Will set up with one of my colleagues for aspiration and injection.    Future considerations: physical therapy, other types of bracing, also referral to orthopedic surgeon.        Disclaimer: This note consists of symbols derived from keyboarding, dictation and/or voice recognition software. As a result, there may be errors in the script that have gone undetected. Please  consider this when interpreting information found in this chart.    The information in this document, created by the medical scribe for me, accurately reflects the services I personally performed and the decisions made by me. I have reviewed and approved this document for accuracy prior to leaving the patient care area.  March 19, 2019 10:11 AM  Billy Ramon DO  [facility]    Again, thank you for allowing me to participate in the care of your patient.        Sincerely,        Billy Ramon DO

## 2019-03-19 NOTE — PATIENT INSTRUCTIONS
Gera to follow up with Primary Care provider regarding elevated blood pressure.    You may take OTC naproxen (Aleve) 440mg (2 capsules) two times a day with food (maximum of 880 mg/day). Otherwise, use the dose as noted on the bottle.  - and -  You may take OTC acetaminophen (Tylenol) 1000 mg every 6 hours with food (Maximum of 3000mg/day).    You may continue with your supplement.    Will set up with one of my colleagues for aspiration and injection.    Future considerations: physical therapy, other types of bracing, also referral to orthopedic surgeon.

## 2019-03-21 ENCOUNTER — OFFICE VISIT (OUTPATIENT)
Dept: FAMILY MEDICINE | Facility: CLINIC | Age: 64
End: 2019-03-21
Payer: COMMERCIAL

## 2019-03-21 VITALS
HEIGHT: 70 IN | DIASTOLIC BLOOD PRESSURE: 66 MMHG | SYSTOLIC BLOOD PRESSURE: 118 MMHG | OXYGEN SATURATION: 99 % | RESPIRATION RATE: 18 BRPM | HEART RATE: 75 BPM | BODY MASS INDEX: 22.48 KG/M2 | WEIGHT: 157 LBS

## 2019-03-21 DIAGNOSIS — Z13.1 SCREENING FOR DIABETES MELLITUS: ICD-10-CM

## 2019-03-21 DIAGNOSIS — Z12.11 SPECIAL SCREENING FOR MALIGNANT NEOPLASMS, COLON: ICD-10-CM

## 2019-03-21 DIAGNOSIS — Z12.5 SCREENING PSA (PROSTATE SPECIFIC ANTIGEN): ICD-10-CM

## 2019-03-21 DIAGNOSIS — E78.5 HYPERLIPIDEMIA LDL GOAL <100: ICD-10-CM

## 2019-03-21 DIAGNOSIS — Z11.59 ENCOUNTER FOR HEPATITIS C SCREENING TEST FOR LOW RISK PATIENT: ICD-10-CM

## 2019-03-21 DIAGNOSIS — Z13.6 CARDIOVASCULAR SCREENING; LDL GOAL LESS THAN 160: ICD-10-CM

## 2019-03-21 DIAGNOSIS — Z13.6 ENCOUNTER FOR ABDOMINAL AORTIC ANEURYSM (AAA) SCREENING: ICD-10-CM

## 2019-03-21 DIAGNOSIS — Z00.01 ENCOUNTER FOR ROUTINE ADULT HEALTH EXAMINATION WITH ABNORMAL FINDINGS: Primary | ICD-10-CM

## 2019-03-21 LAB
ANION GAP SERPL CALCULATED.3IONS-SCNC: 7 MMOL/L (ref 3–14)
BUN SERPL-MCNC: 15 MG/DL (ref 7–30)
CALCIUM SERPL-MCNC: 9.7 MG/DL (ref 8.5–10.1)
CHLORIDE SERPL-SCNC: 108 MMOL/L (ref 94–109)
CHOLEST SERPL-MCNC: 250 MG/DL
CO2 SERPL-SCNC: 25 MMOL/L (ref 20–32)
CREAT SERPL-MCNC: 0.92 MG/DL (ref 0.66–1.25)
GFR SERPL CREATININE-BSD FRML MDRD: 87 ML/MIN/{1.73_M2}
GLUCOSE SERPL-MCNC: 95 MG/DL (ref 70–99)
HCV AB SERPL QL IA: NONREACTIVE
HDLC SERPL-MCNC: 57 MG/DL
LDLC SERPL CALC-MCNC: 177 MG/DL
NONHDLC SERPL-MCNC: 193 MG/DL
POTASSIUM SERPL-SCNC: 4.3 MMOL/L (ref 3.4–5.3)
PSA SERPL-ACNC: 0.36 UG/L (ref 0–4)
SODIUM SERPL-SCNC: 140 MMOL/L (ref 133–144)
TRIGL SERPL-MCNC: 81 MG/DL

## 2019-03-21 PROCEDURE — 99397 PER PM REEVAL EST PAT 65+ YR: CPT | Performed by: PHYSICIAN ASSISTANT

## 2019-03-21 PROCEDURE — 80061 LIPID PANEL: CPT | Performed by: PHYSICIAN ASSISTANT

## 2019-03-21 PROCEDURE — 36415 COLL VENOUS BLD VENIPUNCTURE: CPT | Performed by: PHYSICIAN ASSISTANT

## 2019-03-21 PROCEDURE — 86803 HEPATITIS C AB TEST: CPT | Performed by: PHYSICIAN ASSISTANT

## 2019-03-21 PROCEDURE — 80048 BASIC METABOLIC PNL TOTAL CA: CPT | Performed by: PHYSICIAN ASSISTANT

## 2019-03-21 PROCEDURE — G0103 PSA SCREENING: HCPCS | Performed by: PHYSICIAN ASSISTANT

## 2019-03-21 ASSESSMENT — MIFFLIN-ST. JEOR: SCORE: 1503.4

## 2019-03-21 NOTE — PATIENT INSTRUCTIONS
Preventive Health Recommendations:     See your health care provider every year to    Review health changes.     Discuss preventive care.      Review your medicines if your doctor has prescribed any.      Talk with your health care provider about whether you should have a test to screen for prostate cancer (PSA).    Every 3 years, have a diabetes test (fasting glucose). If you are at risk for diabetes, you should have this test more often.    Every 5 years, have a cholesterol test. Have this test more often if you are at risk for high cholesterol or heart disease.     Every 10 years, have a colonoscopy. Or, have a yearly FIT test (stool test). These exams will check for colon cancer.    Talk to with your health care provider about screening for Abdominal Aortic Aneurysm if you have a family history of AAA or have a history of smoking.    Shots:     Get a flu shot each year.     Get a tetanus shot every 10 years.     Talk to your doctor about your pneumonia vaccines. There are now two you should receive - Pneumovax (PPSV 23) and Prevnar (PCV 13).     Talk to your pharmacist about a shingles vaccine.     Talk to your doctor about the hepatitis B vaccine.  Nutrition:     Eat at least 5 servings of fruits and vegetables each day.     Eat whole-grain bread, whole-wheat pasta and brown rice instead of white grains and rice.     Get adequate Calcium and Vitamin D.   Lifestyle    Exercise for at least 150 minutes a week (30 minutes a day, 5 days a week). This will help you control your weight and prevent disease.     Limit alcohol to one drink per day.     No smoking.     Wear sunscreen to prevent skin cancer.    See your dentist every six months for an exam and cleaning.    See your eye doctor every 1 to 2 years to screen for conditions such as glaucoma, macular degeneration, cataracts, etc.    Personalized Prevention Plan  You are due for the preventive services outlined below.  Your care team is available to assist you  in scheduling these services.  If you have already completed any of these items, please share that information with your care team to update in your medical record.  Health Maintenance Due   Topic Date Due     Meningitis A Vaccine (1 - Risk 2-dose series) 03/10/1957     Meningitis B Vaccine (1 of 2 - Risk Bexsero 2-dose series) 03/10/1964     HIV SCREEN (SYSTEM ASSIGNED)  03/10/1972     Hepatitis C Screening  03/10/1972     Colon Cancer Screening - every 10 years.  03/10/2004     Zoster (Shingles) Vaccine (1 of 2) 03/10/2004     Cholesterol Lab - yearly  10/19/2016     Depression Assessment 2 - yearly  03/17/2017     Flu Vaccine (1) 09/01/2018     AORTIC ANEURYSM SCREENING (SYSTEM ASSIGNED)  03/10/2019     Annual Wellness Visit  03/10/2019     FALL RISK ASSESSMENT  03/10/2019

## 2019-03-21 NOTE — PROGRESS NOTES
SUBJECTIVE:   CC: Gera Garsia is an 64 year old male who presents for preventive health visit.     Healthy Habits:    Do you get at least three servings of calcium containing foods daily (dairy, green leafy vegetables, etc.)? yes    Amount of exercise or daily activities, outside of work: 4 day(s) per week    Problems taking medications regularly not applicable    Medication side effects: No    Have you had an eye exam in the past two years? no    Do you see a dentist twice per year? yes    Do you have sleep apnea, excessive snoring or daytime drowsiness?no          Today's PHQ-2 Score:   PHQ-2 (  [employer]) 3/17/2016 4/15/2015   Q1: Little interest or pleasure in doing things 0 0   Q2: Feeling down, depressed or hopeless 0 0   PHQ-2 Score 0 0       Abuse: Current or Past(Physical, Sexual or Emotional)- No  Do you feel safe in your environment? Yes    Social History     Tobacco Use     Smoking status: Former Smoker     Packs/day: 1.00     Years: 13.00     Pack years: 13.00     Last attempt to quit: 1984     Years since quittin.2     Smokeless tobacco: Never Used   Substance Use Topics     Alcohol use: No     Comment: Formerly beer 5-6 beers/day, quit 10 yrs ago ()     If you drink alcohol do you typically have >3 drinks per day or >7 drinks per week? No                      Last PSA:   PSA   Date Value Ref Range Status   2019 0.36 0 - 4 ug/L Final     Comment:     Assay Method:  Chemiluminescence using Siemens Vista analyzer       Reviewed orders with patient. Reviewed health maintenance and updated orders accordingly - Yes      Reviewed and updated as needed this visit by clinical staff  Tobacco  Allergies  Meds  Med Hx  Surg Hx  Fam Hx  Soc Hx        Reviewed and updated as needed this visit by Provider            ROS:  CONSTITUTIONAL: NEGATIVE for fever, chills, change in weight  INTEGUMENTARY/SKIN: NEGATIVE for worrisome rashes, moles or lesions  EYES: NEGATIVE for vision changes  "or irritation  ENT: NEGATIVE for ear, mouth and throat problems  RESP: NEGATIVE for significant cough or SOB  CV: NEGATIVE for chest pain, palpitations or peripheral edema  GI: NEGATIVE for nausea, abdominal pain, heartburn, or change in bowel habits   male: negative for dysuria, hematuria, decreased urinary stream, erectile dysfunction, urethral discharge  MUSCULOSKELETAL: NEGATIVE for significant arthralgias or myalgia  NEURO: NEGATIVE for weakness, dizziness or paresthesias  PSYCHIATRIC: NEGATIVE for changes in mood or affect    OBJECTIVE:   /66   Pulse 75   Resp 18   Ht 1.778 m (5' 10\")   Wt 71.2 kg (157 lb)   SpO2 99%   BMI 22.53 kg/m    EXAM:  GENERAL: healthy, alert and no distress  EYES: Eyes grossly normal to inspection, PERRL and conjunctivae and sclerae normal  HENT: ear canals and TM's normal, nose and mouth without ulcers or lesions  NECK: no adenopathy, no asymmetry, masses, or scars and thyroid normal to palpation  RESP: lungs clear to auscultation - no rales, rhonchi or wheezes  CV: regular rate and rhythm, normal S1 S2, no S3 or S4, no murmur, click or rub, no peripheral edema and peripheral pulses strong  ABDOMEN: soft, nontender, no hepatosplenomegaly, no masses and bowel sounds normal  MS: no gross musculoskeletal defects noted, no edema  SKIN: no suspicious lesions or rashes  NEURO: Normal strength and tone, mentation intact and speech normal  PSYCH: mentation appears normal, affect normal/bright        ASSESSMENT/PLAN:       ICD-10-CM    1. Encounter for routine adult health examination with abnormal findings Z00.01    2. Encounter for abdominal aortic aneurysm (AAA) screening Z13.6 US Aorta Medicare AAA Screening   3. Special screening for malignant neoplasms, colon Z12.11 GASTROENTEROLOGY ADULT REF PROCEDURE ONLY [facility] [phone]; No Provider Preference   4. Screening for diabetes mellitus Z13.1    5. CARDIOVASCULAR SCREENING; LDL GOAL LESS THAN 160 Z13.6    6. " "Screening PSA (prostate specific antigen) Z12.5    7. Hyperlipidemia LDL goal <100 E78.5        COUNSELING:  Reviewed preventive health counseling, as reflected in patient instructions       Regular exercise       Healthy diet/nutrition    BP Readings from Last 1 Encounters:   03/21/19 118/66     Estimated body mass index is 22.53 kg/m  as calculated from the following:    Height as of this encounter: 1.778 m (5' 10\").    Weight as of this encounter: 71.2 kg (157 lb).           reports that he quit smoking about 35 years ago. He has a 13.00 pack-year smoking history. he has never used smokeless tobacco.      Counseling Resources:  ATP IV Guidelines  Pooled Cohorts Equation Calculator  FRAX Risk Assessment  ICSI Preventive Guidelines  Dietary Guidelines for Americans, 2010  USDA's MyPlate  ASA Prophylaxis  Lung CA Screening    Tera Lee PA-C  [facility] [place]  "

## 2019-03-22 ENCOUNTER — OFFICE VISIT (OUTPATIENT)
Dept: ORTHOPEDICS | Facility: CLINIC | Age: 64
End: 2019-03-22
Payer: COMMERCIAL

## 2019-03-22 VITALS
SYSTOLIC BLOOD PRESSURE: 121 MMHG | HEIGHT: 70 IN | DIASTOLIC BLOOD PRESSURE: 70 MMHG | WEIGHT: 157 LBS | BODY MASS INDEX: 22.48 KG/M2

## 2019-03-22 DIAGNOSIS — M71.21 SYNOVIAL CYST OF RIGHT POPLITEAL SPACE: ICD-10-CM

## 2019-03-22 DIAGNOSIS — M17.0 PRIMARY OSTEOARTHRITIS OF BOTH KNEES: Primary | ICD-10-CM

## 2019-03-22 PROCEDURE — 20611 DRAIN/INJ JOINT/BURSA W/US: CPT | Mod: 50 | Performed by: FAMILY MEDICINE

## 2019-03-22 RX ORDER — TRIAMCINOLONE ACETONIDE 40 MG/ML
40 INJECTION, SUSPENSION INTRA-ARTICULAR; INTRAMUSCULAR
Status: SHIPPED | OUTPATIENT
Start: 2019-03-22

## 2019-03-22 RX ORDER — ROPIVACAINE HYDROCHLORIDE 5 MG/ML
3 INJECTION, SOLUTION EPIDURAL; INFILTRATION; PERINEURAL
Status: SHIPPED | OUTPATIENT
Start: 2019-03-22

## 2019-03-22 RX ADMIN — TRIAMCINOLONE ACETONIDE 40 MG: 40 INJECTION, SUSPENSION INTRA-ARTICULAR; INTRAMUSCULAR at 11:15

## 2019-03-22 RX ADMIN — ROPIVACAINE HYDROCHLORIDE 3 ML: 5 INJECTION, SOLUTION EPIDURAL; INFILTRATION; PERINEURAL at 11:15

## 2019-03-22 ASSESSMENT — MIFFLIN-ST. JEOR: SCORE: 1503.4

## 2019-03-22 NOTE — LETTER
3/22/2019         RE: Gera Garsia  [address] [facility] [place] [address]        Dear Colleague,    Thank you for referring your patient, Gera Garsia, to the [facility]. Please see a copy of my visit note below.    Gera Garsia  :  1955  DOS: 3/22/2019  MRN: [id number]    Sports Medicine Clinic Procedure    Ultrasound Guided Bilateral Intra-Articular Knee Aspiration & Injection  Right knee popliteal cyst aspiration    Clinical History: Gradual onset of chronic bilateral knee pain over the last 5 years.  Large soft tissue mass noted over posterior knee.    Diagnosis:   1. Primary osteoarthritis of both knees    2. Synovial cyst of right popliteal space      Referring Physician: Billy Ramon DO  Large Joint Injection/Arthocentesis: bilateral knee  Date/Time: 3/22/2019 11:15 AM  Performed by: Billy Soto DO  Authorized by: Billy Soto DO     Indications:  Pain  Needle Size:  21 G  Guidance: ultrasound    Approach:  Superolateral  Location:  Knee  Laterality:  Bilateral  Site:  Bilateral knee  Medications (Right):  40 mg triamcinolone 40 MG/ML; 3 mL ropivacaine 5 MG/ML  Aspirate amount (mL):  2  Aspirate:  Serous and yellow  Medications (Left):  40 mg triamcinolone 40 MG/ML; 3 mL ropivacaine 5 MG/ML  Aspirate amount (mL):  12  Aspirate:  Serous and yellow  Outcome:  Tolerated well, no immediate complications  Procedure discussed: discussed risks, benefits, and alternatives    Consent Given by:  Patient  Prep: patient was prepped and draped in usual sterile fashion       Ultrasound Guided Right Popliteal Cyst Aspiration and Injection    Technique: The risks of the procedure were explained to the patient.  A consent was given for the popliteal cyst aspiration and injection.  The patient was evaluated with a [employer] ultrasound machine using a 12 MHz linear probe.   The Right knee was prepped and draped in a sterile manner.  2 ml's of 1%  lidocaine was used fore local anesthetic.  Ultrasound identification of the popliteal cyst, medial head of the gastrocnemius muscle and tendon, semimembranosis tendon and semitendinosis tendon in both long and short axis.  The location of adjacent neurovascular structures were noted and marked.  The probe was placed in long axis to the Rightknee.  A 1.5 inch 18 gauge needle was placed under ultrasound guidance into the popliteal Cyst.  66 ml's of straw colored fluid was aspirated from the cyst.  The needle was removed and there was good hemostasis without complications.  There was ultrasound documentation of needle placement and injection.           Impression:  Successful Bilateral intra-articular knee aspiration and injection and right baker's cyst aspiration.    Plan:  Follow up as directed by Dr Ramon  Expectations and goals of CSI reviewed  Often 2-3 days for steroid effect, and can take up to two weeks for maximum effect  We discussed modified progressive pain-free activity as tolerated  Do not overuse in first two weeks if feeling better due to concern for vulnerability while steroid is working  Supportive care reviewed  All questions were answered today  Contact us with additional questions or concerns  Signs and sx of concern reviewed      Billy Soto DO, CAQ  Primary Care Sports Medicine  [facility]         Again, thank you for allowing me to participate in the care of your patient.        Sincerely,        Billy Soto DO

## 2019-03-22 NOTE — PROGRESS NOTES
Gera Garsia  :  1955  DOS: 3/22/2019  MRN: [id number]    Sports Medicine Clinic Procedure    Ultrasound Guided Bilateral Intra-Articular Knee Aspiration & Injection  Right knee popliteal cyst aspiration    Clinical History: Gradual onset of chronic bilateral knee pain over the last 5 years.  Large soft tissue mass noted over posterior knee.    Diagnosis:   1. Primary osteoarthritis of both knees    2. Synovial cyst of right popliteal space      Referring Physician: Billy Ramon DO  Large Joint Injection/Arthocentesis: bilateral knee  Date/Time: 3/22/2019 11:15 AM  Performed by: Billy Soto DO  Authorized by: Billy Soto DO     Indications:  Pain  Needle Size:  21 G  Guidance: ultrasound    Approach:  Superolateral  Location:  Knee  Laterality:  Bilateral  Site:  Bilateral knee  Medications (Right):  40 mg triamcinolone 40 MG/ML; 3 mL ropivacaine 5 MG/ML  Aspirate amount (mL):  2  Aspirate:  Serous and yellow  Medications (Left):  40 mg triamcinolone 40 MG/ML; 3 mL ropivacaine 5 MG/ML  Aspirate amount (mL):  12  Aspirate:  Serous and yellow  Outcome:  Tolerated well, no immediate complications  Procedure discussed: discussed risks, benefits, and alternatives    Consent Given by:  Patient  Prep: patient was prepped and draped in usual sterile fashion       Ultrasound Guided Right Popliteal Cyst Aspiration and Injection    Technique: The risks of the procedure were explained to the patient.  A consent was given for the popliteal cyst aspiration and injection.  The patient was evaluated with a [employer] ultrasound machine using a 12 MHz linear probe.   The Right knee was prepped and draped in a sterile manner.  2 ml's of 1% lidocaine was used fore local anesthetic.  Ultrasound identification of the popliteal cyst, medial head of the gastrocnemius muscle and tendon, semimembranosis tendon and semitendinosis tendon in both long and short axis.  The location of adjacent  neurovascular structures were noted and marked.  The probe was placed in long axis to the Rightknee.  A 1.5 inch 18 gauge needle was placed under ultrasound guidance into the popliteal Cyst.  66 ml's of straw colored fluid was aspirated from the cyst.  The needle was removed and there was good hemostasis without complications.  There was ultrasound documentation of needle placement and injection.           Impression:  Successful Bilateral intra-articular knee aspiration and injection and right baker's cyst aspiration.    Plan:  Follow up as directed by Dr Ramon  Expectations and goals of CSI reviewed  Often 2-3 days for steroid effect, and can take up to two weeks for maximum effect  We discussed modified progressive pain-free activity as tolerated  Do not overuse in first two weeks if feeling better due to concern for vulnerability while steroid is working  Supportive care reviewed  All questions were answered today  Contact us with additional questions or concerns  Signs and sx of concern reviewed      Billy Soto DO, CAMARGA  Primary Care Sports Medicine  [facility]

## 2019-11-27 ENCOUNTER — TELEPHONE (OUTPATIENT)
Dept: FAMILY MEDICINE | Facility: CLINIC | Age: 64
End: 2019-11-27

## 2019-11-29 NOTE — TELEPHONE ENCOUNTER
Left message for patient to call back pod 1 hotline([phone]). He has a colonoscopy order in his chart. Jalyn Wahl MA     No release to speak with Michell (person who called regarding order) on file. Jalyn Wahl MA

## 2019-12-30 ENCOUNTER — HOSPITAL ENCOUNTER (OUTPATIENT)
Facility: AMBULATORY SURGERY CENTER | Age: 64
Discharge: HOME | End: 2019-12-30
Attending: SURGERY | Admitting: SURGERY
Payer: COMMERCIAL

## 2019-12-30 VITALS
RESPIRATION RATE: 18 BRPM | SYSTOLIC BLOOD PRESSURE: 116 MMHG | TEMPERATURE: 98.2 F | OXYGEN SATURATION: 98 % | DIASTOLIC BLOOD PRESSURE: 66 MMHG | HEART RATE: 66 BPM

## 2019-12-30 LAB — COLONOSCOPY: NORMAL

## 2019-12-30 PROCEDURE — G8907 PT DOC NO EVENTS ON DISCHARG: HCPCS

## 2019-12-30 PROCEDURE — G8918 PT W/O PREOP ORDER IV AB PRO: HCPCS

## 2019-12-30 PROCEDURE — 45385 COLONOSCOPY W/LESION REMOVAL: CPT

## 2019-12-30 PROCEDURE — 45385 COLONOSCOPY W/LESION REMOVAL: CPT | Mod: PT | Performed by: SURGERY

## 2019-12-30 PROCEDURE — 88305 TISSUE EXAM BY PATHOLOGIST: CPT | Performed by: SURGERY

## 2019-12-30 PROCEDURE — G0500 MOD SEDAT ENDO SERVICE >5YRS: HCPCS | Performed by: SURGERY

## 2019-12-30 RX ORDER — FENTANYL CITRATE 50 UG/ML
INJECTION, SOLUTION INTRAMUSCULAR; INTRAVENOUS PRN
Status: DISCONTINUED | OUTPATIENT
Start: 2019-12-30 | End: 2019-12-30 | Stop reason: HOSPADM

## 2019-12-30 RX ORDER — ONDANSETRON 4 MG/1
4 TABLET, ORALLY DISINTEGRATING ORAL EVERY 6 HOURS PRN
Status: CANCELLED | OUTPATIENT
Start: 2019-12-30

## 2019-12-30 RX ORDER — NALOXONE HYDROCHLORIDE 0.4 MG/ML
.1-.4 INJECTION, SOLUTION INTRAMUSCULAR; INTRAVENOUS; SUBCUTANEOUS
Status: CANCELLED | OUTPATIENT
Start: 2019-12-30 | End: 2019-12-31

## 2019-12-30 RX ORDER — LIDOCAINE 40 MG/G
CREAM TOPICAL
Status: DISCONTINUED | OUTPATIENT
Start: 2019-12-30 | End: 2019-12-31 | Stop reason: HOSPADM

## 2019-12-30 RX ORDER — ONDANSETRON 2 MG/ML
4 INJECTION INTRAMUSCULAR; INTRAVENOUS EVERY 6 HOURS PRN
Status: CANCELLED | OUTPATIENT
Start: 2019-12-30

## 2019-12-30 RX ORDER — FLUMAZENIL 0.1 MG/ML
0.2 INJECTION, SOLUTION INTRAVENOUS
Status: CANCELLED | OUTPATIENT
Start: 2019-12-30 | End: 2019-12-30

## 2020-01-02 LAB — COPATH REPORT: NORMAL

## 2020-06-23 ENCOUNTER — TELEPHONE (OUTPATIENT)
Dept: FAMILY MEDICINE | Facility: CLINIC | Age: 65
End: 2020-06-23

## 2020-06-23 NOTE — TELEPHONE ENCOUNTER
Patient stopped by [facility] asking for a letter to identify him so that he may obtain a replacement Social Security card. His [place] ID was verified and a letter was written for patient.

## 2020-06-29 ENCOUNTER — OFFICE VISIT (OUTPATIENT)
Dept: FAMILY MEDICINE | Facility: CLINIC | Age: 65
End: 2020-06-29
Payer: COMMERCIAL

## 2020-06-29 VITALS
WEIGHT: 154 LBS | TEMPERATURE: 98.1 F | SYSTOLIC BLOOD PRESSURE: 116 MMHG | OXYGEN SATURATION: 99 % | RESPIRATION RATE: 16 BRPM | BODY MASS INDEX: 22.81 KG/M2 | HEART RATE: 74 BPM | DIASTOLIC BLOOD PRESSURE: 68 MMHG | HEIGHT: 69 IN

## 2020-06-29 DIAGNOSIS — R25.3 TWITCHING: ICD-10-CM

## 2020-06-29 DIAGNOSIS — R63.4 WEIGHT LOSS: Primary | ICD-10-CM

## 2020-06-29 LAB
ANION GAP SERPL CALCULATED.3IONS-SCNC: 5 MMOL/L (ref 3–14)
BUN SERPL-MCNC: 15 MG/DL (ref 7–30)
CALCIUM SERPL-MCNC: 9.9 MG/DL (ref 8.5–10.1)
CHLORIDE SERPL-SCNC: 107 MMOL/L (ref 94–109)
CK SERPL-CCNC: 451 U/L (ref 30–300)
CO2 SERPL-SCNC: 26 MMOL/L (ref 20–32)
CREAT SERPL-MCNC: 0.85 MG/DL (ref 0.66–1.25)
ERYTHROCYTE [DISTWIDTH] IN BLOOD BY AUTOMATED COUNT: 14.2 % (ref 10–15)
GFR SERPL CREATININE-BSD FRML MDRD: >90 ML/MIN/{1.73_M2}
GLUCOSE SERPL-MCNC: 91 MG/DL (ref 70–99)
HCT VFR BLD AUTO: 40.5 % (ref 40–53)
HGB BLD-MCNC: 13.8 G/DL (ref 13.3–17.7)
MAGNESIUM SERPL-MCNC: 2.1 MG/DL (ref 1.6–2.3)
MCH RBC QN AUTO: 34 PG (ref 26.5–33)
MCHC RBC AUTO-ENTMCNC: 34.1 G/DL (ref 31.5–36.5)
MCV RBC AUTO: 100 FL (ref 78–100)
PLATELET # BLD AUTO: 370 10E9/L (ref 150–450)
POTASSIUM SERPL-SCNC: 5.1 MMOL/L (ref 3.4–5.3)
RBC # BLD AUTO: 4.06 10E12/L (ref 4.4–5.9)
SODIUM SERPL-SCNC: 138 MMOL/L (ref 133–144)
TSH SERPL DL<=0.005 MIU/L-ACNC: 0.81 MU/L (ref 0.4–4)
WBC # BLD AUTO: 7.6 10E9/L (ref 4–11)

## 2020-06-29 PROCEDURE — G0009 ADMIN PNEUMOCOCCAL VACCINE: HCPCS | Performed by: PHYSICIAN ASSISTANT

## 2020-06-29 PROCEDURE — 36415 COLL VENOUS BLD VENIPUNCTURE: CPT | Performed by: PHYSICIAN ASSISTANT

## 2020-06-29 PROCEDURE — 82550 ASSAY OF CK (CPK): CPT | Performed by: PHYSICIAN ASSISTANT

## 2020-06-29 PROCEDURE — 99214 OFFICE O/P EST MOD 30 MIN: CPT | Mod: 25 | Performed by: PHYSICIAN ASSISTANT

## 2020-06-29 PROCEDURE — 84443 ASSAY THYROID STIM HORMONE: CPT | Performed by: PHYSICIAN ASSISTANT

## 2020-06-29 PROCEDURE — 90670 PCV13 VACCINE IM: CPT | Performed by: PHYSICIAN ASSISTANT

## 2020-06-29 PROCEDURE — 80048 BASIC METABOLIC PNL TOTAL CA: CPT | Performed by: PHYSICIAN ASSISTANT

## 2020-06-29 PROCEDURE — 83735 ASSAY OF MAGNESIUM: CPT | Performed by: PHYSICIAN ASSISTANT

## 2020-06-29 PROCEDURE — 85027 COMPLETE CBC AUTOMATED: CPT | Performed by: PHYSICIAN ASSISTANT

## 2020-06-29 ASSESSMENT — MIFFLIN-ST. JEOR: SCORE: 1471.04

## 2020-06-29 NOTE — LETTER
July 1, 2020      Gera Garsia  [address]  [place] [address]        Dear ,    We are writing to inform you of your test results.    Labs drawn in clinic are not concerning.     Resulted Orders   CBC with platelets   Result Value Ref Range    WBC 7.6 4.0 - 11.0 10e9/L    RBC Count 4.06 (L) 4.4 - 5.9 10e12/L    Hemoglobin 13.8 13.3 - 17.7 g/dL    Hematocrit 40.5 40.0 - 53.0 %     78 - 100 fl    MCH 34.0 (H) 26.5 - 33.0 pg    MCHC 34.1 31.5 - 36.5 g/dL    RDW 14.2 10.0 - 15.0 %    Platelet Count 370 150 - 450 10e9/L   Basic metabolic panel  (Ca, Cl, CO2, Creat, Gluc, K, Na, BUN)   Result Value Ref Range    Sodium 138 133 - 144 mmol/L    Potassium 5.1 3.4 - 5.3 mmol/L    Chloride 107 94 - 109 mmol/L    Carbon Dioxide 26 20 - 32 mmol/L    Anion Gap 5 3 - 14 mmol/L    Glucose 91 70 - 99 mg/dL    Urea Nitrogen 15 7 - 30 mg/dL    Creatinine 0.85 0.66 - 1.25 mg/dL    GFR Estimate >90 >60 mL/min/[1.73_m2]      Comment:      Non  GFR Calc  Starting 12/18/2018, serum creatinine based estimated GFR (eGFR) will be   calculated using the Chronic Kidney Disease Epidemiology Collaboration   (CKD-EPI) equation.      GFR Estimate If Black >90 >60 mL/min/[1.73_m2]      Comment:       GFR Calc  Starting 12/18/2018, serum creatinine based estimated GFR (eGFR) will be   calculated using the Chronic Kidney Disease Epidemiology Collaboration   (CKD-EPI) equation.      Calcium 9.9 8.5 - 10.1 mg/dL   Magnesium   Result Value Ref Range    Magnesium 2.1 1.6 - 2.3 mg/dL   TSH with free T4 reflex   Result Value Ref Range    TSH 0.81 0.40 - 4.00 mU/L   CK total   Result Value Ref Range    CK Total 451 (H) 30 - 300 U/L       If you have any questions or concerns, please call the clinic at the number listed above.       Sincerely,        MERLIN Richardson/clarisa

## 2020-06-29 NOTE — PATIENT INSTRUCTIONS
Lakeshia Boss,    Thank you for allowing [facility] to manage your care.    I am unsure the cause of your symptoms, however your exam is worrisome for underlying neurological disorder.  I would like you to see a neurologist in clinic to discuss your symptoms.    I ordered some blood work, please go to the laboratory to get your laboratory studies.    I made a neurology referral, they will be calling in approximately 1 week to set up your appointment.  If you do not hear from them, please call the specialty number on your after visit.     Please allow 1-2 business days for our office to call you in regards to your laboratory/radiological studies.  If not done so, I encourage you to login into [employer] [email or web address] to review your results as well.     If you have any questions or concerns, please feel free to call us at [phone]    Sincerely,    Gordy Galvez PA-C    Did you know?  You can schedule an e-Visit for certain simple non-emergent issue for your convenience.  To learn more about or start an eVisit, simply login to [employer], click  Visits  on top banner, click  Start a Virtual Visit  drop down, and click  Symptom-Specific E-Visit     Patient Education   Create a Plate  How to Build a Healthy Meal  How do I build a healthy meal?  Divide your plate by food group to balance your diet and still enjoy a variety of foods. Each meal should include:  Breakfast: protein, healthy carb  Lunch: protein, vegetable, healthy carb  Dinner: protein, vegetable, healthy carb   Protein:     Focus on lean proteins like chicken or turkey without skin, lean beef, fish and seafood and lean pork.    Vegetarian choices: tofu, soy crumbles, legumes, lentils, egg whites, eggs and low-fat cottage cheese    A protein portion is 2 to 3 oz. (about the size of a deck of cards). A 3 oz. serving of cooked legumes or lentils is   cup..  Non-starchy vegetables  Focus on vegetables such as kale, spinach,  broccoli, cucumber, bell pepper, cauliflower, carrots, cabbage, zucchini, asparagus, tomatoes, celery, eggplant, green beans, Brussel sprouts and onions.  Carbohydrates (carbs)    Pick 1 source at each meal.    Breads and grains: whole grain pasta, whole grain cereal, tortilla, brown rice, oatmeal; Follow serving size on nutrition facts label.    Starchy vegetables: corn, squash, peas, potatoes    Fruits: all fruits  Healthy fat     Focus on olive oil, canola oil, walnuts, peanuts, almonds, cashews, nut butters, pecans, pistachios, avocado flaxseeds, soy nuts, sunflower seeds, pumpkin seeds.    Limit fat intake (salad dressing, oils, mayonnaise and condiments). Use a measuring spoon to help control the amount of oil or dressing.    Limit nuts and seeds (also counts as a protein source).  What size is my plate? _________________  What are my portions?    For informational purposes only. Not to replace the advice of your health care provider. Copyright   2018 [facility].   All rights reserved. Reviewed by Bariatric Nutrition Team. [employer] 106409 - Rev 10/18.

## 2020-07-15 DIAGNOSIS — R63.4 WEIGHT LOSS: ICD-10-CM

## 2020-07-15 DIAGNOSIS — R25.3 TWITCHING: Primary | ICD-10-CM

## 2020-07-15 NOTE — PROGRESS NOTES
Spoke with pt's SO and encouraged her to assist him with neurology follow up. Referral again placed for eval at [facility].    MERLIN Richardson on 7/15/2020 at 1:13 PM

## 2020-07-16 ENCOUNTER — TELEPHONE (OUTPATIENT)
Dept: FAMILY MEDICINE | Facility: CLINIC | Age: 65
End: 2020-07-16

## 2020-07-16 NOTE — TELEPHONE ENCOUNTER
Spoke with patient and he agrees to call [facility] neurology department to arrange for outpatient evaluation as soon as possible for his upper extremity fasciculations and weight loss.    MERLIN Richardson on 7/16/2020 at 1:40 PM

## 2020-09-03 ENCOUNTER — VIRTUAL VISIT (OUTPATIENT)
Dept: NEUROLOGY | Facility: CLINIC | Age: 65
End: 2020-09-03
Attending: PHYSICIAN ASSISTANT
Payer: COMMERCIAL

## 2020-09-03 DIAGNOSIS — R25.3 MUSCLE TWITCHING: ICD-10-CM

## 2020-09-03 DIAGNOSIS — R29.898 LEFT HAND WEAKNESS: Primary | ICD-10-CM

## 2020-09-03 PROCEDURE — 99213 OFFICE O/P EST LOW 20 MIN: CPT | Mod: 95 | Performed by: PSYCHIATRY & NEUROLOGY

## 2020-09-03 ASSESSMENT — PAIN SCALES - GENERAL: PAINLEVEL: NO PAIN (0)

## 2020-09-03 NOTE — PROGRESS NOTES
Visit Date:   2020      This is a telephone consultation being done via telephone because of COVID-19.      Gera Garsia is a 65-year-old male referred by Bird Galvez for evaluation of muscle twitching and weakness.      The patient indicated the muscle twitching started about 2 years ago.  It mainly initially involved his upper extremities from the shoulders to the elbows.  As time went on, he has noted similar muscle twitching involving the calves and thighs.      This past year, he has appreciated a loss of strength and atrophy affecting his left hand, which makes it difficult for him to do things like buttoning.  He denies any lower extremity weakness.  Occasionally, his left hand will feel numb and tingling, particularly during the night.  He has had no persistent sensory loss.  He denies any trouble with his speech, although he does sound slightly dysarthric over the phone.  He denies any difficulty with swallowing.  He reports no shortness of breath.      He has been experiencing cramps in his calves at night for a couple of years.  He indicates his gait is fine.  He has had no falls.  He denies any trouble with bowel or bladder control.      In , he had a number of laboratory studies done.  He had a slightly elevated CK of 451 ().  CBC, basic metabolic panel, magnesium and TSH were normal.      His past medical history really is unremarkable.  He has had some issues with his knees, but does not have any apparent underlying arthritic problems as best I can tell.  Notes indicate that he is asplenic C.      The only medication he is using his fish oil.      HE HAS NO KNOWN MEDICAL ALLERGIES.      His father  from a myocardial infarction and his mother  from some form of lung disease.  He is not aware of any family history of neuromuscular disease.      He is retired.  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  He denies any chemical or insecticide  exposure.      He is a former smoker.  He does not use alcohol.      IMPRESSION:  Report of muscle twitching, left hand weakness and atrophy.      His symptoms are worrisome for motor neuron disease.      PLAN:  I did advise the patient that I would like to see him face-to-face for a detailed neurologic examination and he is willing to come in and be seen.  An appointment will be arranged for a week from Friday at the [facility].      Total telephone visit time was 20 minutes, from 9:51 a.m. until 10:11 a.m.         MARYBETH ZHONG MD             D: 2020   T: 2020   MT: RIGOBERTO      Name:     BART KHAN   MRN:      [id number]        Account:      [id number]   :      1955           Visit Date:   2020      Document: [id number]

## 2020-09-03 NOTE — LETTER
"    9/3/2020         RE: Gera Garsia  [address]  [facility] [address]        Dear Colleague,    Thank you for referring your patient, Gera Garsia, to the [facility]. Please see a copy of my visit note below.    Gera Garsia is a 65 year old male who is being evaluated via a billable telephone visit.      The patient has been notified of following:     \"This telephone visit will be conducted via a call between you and your physician/provider. We have found that certain health care needs can be provided without the need for a physical exam.  This service lets us provide the care you need with a short phone conversation.  If a prescription is necessary we can send it directly to your pharmacy.  If lab work is needed we can place an order for that and you can then stop by our lab to have the test done at a later time.    Telephone visits are billed at different rates depending on your insurance coverage. During this emergency period, for some insurers they may be billed the same as an in-person visit.  Please reach out to your insurance provider with any questions.    If during the course of the call the physician/provider feels a telephone visit is not appropriate, you will not be charged for this service.\"    Patient has given verbal consent for Telephone visit?  Yes    What phone number would you like to be contacted at? [phone]    How would you like to obtain your AVS? Mail a copy     Kalli Watson LPN      Phone call duration: 20 minutes        Visit Date:   09/03/2020      This is a telephone consultation being done via telephone because of COVID-19.      Gera Garsia is a 65-year-old male referred by Bird Galvez for evaluation of muscle twitching and weakness.      The patient indicated the muscle twitching started about 2 years ago.  It mainly initially involved his upper extremities from the shoulders to the elbows.  As time went on, he has noted similar muscle " twitching involving the calves and thighs.      This past year, he has appreciated a loss of strength and atrophy affecting his left hand, which makes it difficult for him to do things like buttoning.  He denies any lower extremity weakness.  Occasionally, his left hand will feel numb and tingling, particularly during the night.  He has had no persistent sensory loss.  He denies any trouble with his speech, although he does sound slightly dysarthric over the phone.  He denies any difficulty with swallowing.  He reports no shortness of breath.      He has been experiencing cramps in his calves at night for a couple of years.  He indicates his gait is fine.  He has had no falls.  He denies any trouble with bowel or bladder control.      In , he had a number of laboratory studies done.  He had a slightly elevated CK of 451 ().  CBC, basic metabolic panel, magnesium and TSH were normal.      His past medical history really is unremarkable.  He has had some issues with his knees, but does not have any apparent underlying arthritic problems as best I can tell.  Notes indicate that he is asplenic C.      The only medication he is using his fish oil.      HE HAS NO KNOWN MEDICAL ALLERGIES.      His father  from a myocardial infarction and his mother  from some form of lung disease.  He is not aware of any family history of neuromuscular disease.      He is retired.  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  Now, he cares for his - acres.  He denies any chemical or insecticide exposure.      He is a former smoker.  He does not use alcohol.      IMPRESSION:  Report of muscle twitching, left hand weakness and atrophy.      His symptoms are worrisome for motor neuron disease.      PLAN:  I did advise the patient that I would like to see him face-to-face for a detailed neurologic examination and he is willing to come in and be seen.  [place] appointment [place] [facility] [place]  [facility].      Total telephone visit time was 20 minutes, from 9:51 a.m. until 10:11 a.m.         OSIEL ORTIZ MD             D: 2020   T: 2020   MT: RIGOBERTO      Name:     BART KHAN   MRN:      [id number]        Account:      [id number]   :      1955           Visit Date:   2020      Document: [id number]        Again, thank you for allowing me to participate in the care of your patient.        Sincerely,        Osiel Ortiz MD

## 2020-09-03 NOTE — PROGRESS NOTES
"Gera Garsia is a 65 year old male who is being evaluated via a billable telephone visit.      The patient has been notified of following:     \"This telephone visit will be conducted via a call between you and your physician/provider. We have found that certain health care needs can be provided without the need for a physical exam.  This service lets us provide the care you need with a short phone conversation.  If a prescription is necessary we can send it directly to your pharmacy.  If lab work is needed we can place an order for that and you can then stop by our lab to have the test done at a later time.    Telephone visits are billed at different rates depending on your insurance coverage. During this emergency period, for some insurers they may be billed the same as an in-person visit.  Please reach out to your insurance provider with any questions.    If during the course of the call the physician/provider feels a telephone visit is not appropriate, you will not be charged for this service.\"    Patient has given verbal consent for Telephone visit?  Yes    What phone number would you like to be contacted at? [phone]    How would you like to obtain your AVS? Mail a copy     Kalli Watson LPN      Phone call duration: 20 minutes      "

## 2020-09-11 ENCOUNTER — OFFICE VISIT (OUTPATIENT)
Dept: NEUROLOGY | Facility: CLINIC | Age: 65
End: 2020-09-11
Payer: COMMERCIAL

## 2020-09-11 VITALS
RESPIRATION RATE: 15 BRPM | HEIGHT: 69 IN | DIASTOLIC BLOOD PRESSURE: 81 MMHG | SYSTOLIC BLOOD PRESSURE: 136 MMHG | WEIGHT: 157 LBS | OXYGEN SATURATION: 99 % | BODY MASS INDEX: 23.25 KG/M2 | HEART RATE: 107 BPM

## 2020-09-11 DIAGNOSIS — G12.20 MOTOR NEURON DISEASE (H): Primary | ICD-10-CM

## 2020-09-11 ASSESSMENT — MIFFLIN-ST. JEOR: SCORE: 1484.59

## 2020-09-11 ASSESSMENT — PAIN SCALES - GENERAL: PAINLEVEL: NO PAIN (0)

## 2020-09-11 NOTE — PROGRESS NOTES
Service Date: 09/11/2020      HISTORY OF PRESENT ILLNESS:  Gera Garsia returns for a face-to-face visit.  I did a telephone consultation on 09/03/2020.  The patient at that time reported muscle twitching involving mainly his upper extremities and later his calves and thighs that started about 2 years ago and then over the past year a progressive loss of strength and atrophy involving his left hand.      EXAMINATION:  The patient's heart rate is 107.  Blood pressure 136/81.      Initially, I thought the patient's voice was slightly strained, but as time went on, I could not definitely say he had dysarthria.  Pupils are equal, round and react well to light.  He has full extraocular motility.  Facial strength and sensation are normal.  Palate moves in the midline.  There is a question of some fasciculations involving the right side of his tongue, but there is no significant tongue atrophy or tongue weakness appreciated.      Motor examination is notable for florid fasciculations involving both upper arms and forearms.  He also has occasional fasciculations involving the left thigh and calf, more than the right.  He has atrophy of the left interosseous and thenar eminence.  There also appears to be relative atrophy of the left volar forearm muscles.      On manual muscle testing, he has good strength in the right upper extremity aside from mild interosseous weakness (4+).  In the left upper extremity, he actually has good strength at the shoulder and elbow but weakness of wrist flexion (4),  interosseous (approximately 4-) and thumb abduction (3-).  In lower extremity, he has good strength except for mild ankle dorsiflexor weakness on the left (4+).  He had difficulty heel walking on the left as well.      Sensory examination is notable only for depressed vibratory sense in the toes on the left.  He has intact position sense.  He has intact pinprick in the upper and lower extremities.  Finger-to-nose is done well.   His gait is relatively unremarkable.  He is a bit bowlegged.  Reflexes are 2+ in the right upper extremity with increased finger flexor reflexes on the right, 3+ at the left biceps, 2+ at left triceps, 3+ at the knees and 2+ at the ankles.  Right plantar response is flexor and the left is neutral.  He did not have a palmomental or snout reflex.      IMPRESSION:  Probable motor neuron disease.      PLAN:  I did discuss motor neuron disease with him.  I think that is the likely diagnosis.  He does not have definite upper motor neuron signs, but I also discussed with him one of the most common form of upper motor neuron disease is ALS.      He does need to have electrodiagnostic testing done for further evaluation, and this will be ordered.      Ultimately, I will likely refer him to either the ALS Clinic or a neuromuscular specialist pending the results of the EMG study.      Total visit time was 30 minutes.  More than 50% of this was spent in counseling.     ADDENDUM 20: Discussed EMG results with patient. Findings of MND. Discussed possibility of ALS. He has an appointment at ALS clinic on 10/1/20.     cc:   Tera Lee PA-C   [facility]   [address] [place] [address]         MARYBETH ZHONG MD             D: 2020   T: 2020   MT: norah      Name:     BART KHAN   MRN:      [id number]        Account:      [id number]   :      1955           Service Date: 2020      Document: [id number]

## 2020-09-11 NOTE — LETTER
9/11/2020       RE: Gera Garsia  [address] [facility] [address]     Dear Colleague,    Thank you for referring your patient, Gera Garsia, to the [facility] NEUROLOGY at [facility]. Please see a copy of my visit note below.    Service Date: 09/11/2020      HISTORY OF PRESENT ILLNESS:  Gera Garsia returns for a face-to-face visit.  I did a telephone consultation on 09/03/2020.  The patient at that time reported muscle twitching involving mainly his upper extremities and later his calves and thighs that started about 2 years ago and then over the past year a progressive loss of strength and atrophy involving his left hand.      EXAMINATION:  The patient's heart rate is 107.  Blood pressure 136/81.      Initially, I thought the patient's voice was slightly strained, but as time went on, I could not definitely say he had dysarthria.  Pupils are equal, round and react well to light.  He has full extraocular motility.  Facial strength and sensation are normal.  Palate moves in the midline.  There is a question of some fasciculations involving the right side of his tongue, but there is no significant tongue atrophy or tongue weakness appreciated.      Motor examination is notable for florid fasciculations involving both upper arms and forearms.  He also has occasional fasciculations involving the left thigh and calf, more than the right.  He has atrophy of the left interosseous and thenar eminence.  There also appears to be relative atrophy of the left volar forearm muscles.      On manual muscle testing, he has good strength in the right upper extremity aside from mild interosseous weakness (4+).  In the left upper extremity, he actually has good strength at the shoulder and elbow but weakness of wrist flexion (4),  interosseous (approximately 4-) and thumb abduction (3-).  In lower extremity, he has good strength except for mild ankle dorsiflexor weakness on the left (4+).   He had difficulty heel walking on the left as well.      Sensory examination is notable only for depressed vibratory sense in the toes on the left.  He has intact position sense.  He has intact pinprick in the upper and lower extremities.  Finger-to-nose is done well.  His gait is relatively unremarkable.  He is a bit bowlegged.  Reflexes are 2+ in the right upper extremity with increased finger flexor reflexes on the right, 3+ at the left biceps, 2+ at left triceps, 3+ at the knees and 2+ at the ankles.  Right plantar response is flexor and the left is neutral.  He did not have a palmomental or snout reflex.      IMPRESSION:  Probable motor neuron disease.      PLAN:  I did discuss motor neuron disease with him.  I think that is the likely diagnosis.  He does not have definite upper motor neuron signs, but I also discussed with him one of the most common form of upper motor neuron disease is ALS.      He does need to have electrodiagnostic testing done for further evaluation, and this will be ordered.      Ultimately, I will likely refer him to either the ALS Clinic or a neuromuscular specialist pending the results of the EMG study.      Total visit time was 30 minutes.  More than 50% of this was spent in counseling.      cc:   Tera Lee PA-C   [facility]   [address] [facility]   [place] [address]         MARYBETH ZHONG MD             D: 2020   T: 2020   MT: norah      Name:     BART KHAN   MRN:      [id number]        Account:      [id number]   :      1955           Service Date: 2020      Document: [id number]

## 2020-09-17 ENCOUNTER — OFFICE VISIT (OUTPATIENT)
Dept: NEUROLOGY | Facility: CLINIC | Age: 65
End: 2020-09-17
Attending: PSYCHIATRY & NEUROLOGY
Payer: COMMERCIAL

## 2020-09-17 DIAGNOSIS — G60.8 SENSORY POLYNEUROPATHY: ICD-10-CM

## 2020-09-17 DIAGNOSIS — G12.20 MOTOR NEURON DISEASE (H): ICD-10-CM

## 2020-09-17 DIAGNOSIS — G56.02 CARPAL TUNNEL SYNDROME OF LEFT WRIST: Primary | ICD-10-CM

## 2020-09-17 NOTE — PROGRESS NOTES
[facility]  Electrodiagnostic Laboratory    Nerve Conduction & EMG Report    Patient:       Gera Garsia  Patient ID:    [id number]  Gender:        Male  YOB: 1955  Age:           65 Years 6 Months      Referring Provider: Dr. Ortiz    History & Examination:    Mr. Garsia is a 65-year-old man presenting with progressive weakness and fasciculations of the left upper limb now with bilateral lower limb fasciculations.  EMG/NCS was requested to evaluate for motor neuron disease.    Techniques: Motor and sensory conduction studies were done with surface recording electrodes. EMG was done with a concentric needle electrode.        Results:    Left median antidromic sensory nerve conduction study showed reduced SNAP amplitude and markedly attenuated conduction velocity.  Left ulnar and radial antidromic sensory nerve conduction studies were normal.  Left sural antidromic sensory nerve conduction study showed mildly attenuated SNAP amplitude and normal conduction velocity.  Left median motor nerve conduction study showed severely reduced distal amplitude with markedly prolonged distal latency.  Left ulnar motor nerve conduction study showed mild reduction in amplitude with normal distal latency and conduction velocities, and no segmental amplitude changes with proximal stimulation.  Left peroneal motor nerve conduction study (EDB) was normal.  Left tibial motor nerve conduction study was normal except for very mildly prolonged distal latency that was likely secondary to cold limb.  Left tibial F wave was normal.      EMG of the proximal and distal muscles of the left lower limb showed 1-2+ fasciculations in all muscles, with 1+ fibrillations in the gastrocnemius.  MUAPs showed increased amplitude and duration along with reduced recruitment in the left TA, VL, and gluteus medius.  Thoracic paraspinal muscles showed 2+ fibrillations and 2+ fasciculations.  EMG of the proximal and distal  muscles of the left upper limb showed 2+ fasciculations in all muscles, and fibrillations/positive waves at the left FDI and pronator teres (3+ and 2+ respectively). MUAPs in this region showed increased amplitude and duration and reduced recruitment in the left deltoid, triceps, and pronator teres.    Interpretation:    This is an abnormal study.  There is electrophysiologic evidence of:    1) Widespread denervation changes in the cervical, thoracic, and lumbosacral myotomes. Abnormal spontaneous activity included prominent fasciculations in all muscles, whereas fibrillations/positive waves were seen in at least one muscle in each region. Chronic changes with motor unit remodeling were noted at the cervical and lumbosacral myotomes.  These findings localize to a disorder affecting the motor neurons, motor roots or their axons. There is no conduction block or other demyelinating features to suggest a diagnosis of multifocal motor neuropathy.   2) A moderate to severe left median neuropathy at the wrist as can be seen in carpal tunnel syndrome.  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    Comment: The patient will be scheduled for follow up appointment in the ALS Clinic.    EMG Physician:     Rai Terry MD  Neuromuscular Fellow    ATTENDING ADDENDUM: I was present during the entire study and directly supervised Fellow Dr Terry who performed it. I agree with the analysis of results and interpretation as above, which I personally reviewed and edited. Kamari Wolf MD      Sensory NCS      Nerve / Sites Rec. Site Onset Peak NP Amp Ref. PP Amp Dist Cal Ref. Temp     ms ms  V  V  V cm m/s m/s  C   L MEDIAN - Dig II Anti      Wrist Dig II 4.95 6.04 5.2 10.0 5.3 14 28.3 48.0 33.9   L ULNAR - Dig V Anti      Wrist Dig V 2.92 3.65 8.8 8.0 13.0 14 48.0 48.0 33.1   L RADIAL - Snuff      Forearm Snuff 1.82 2.50 17.3 15.0 19.0 10 54.9  48.0 32.5   L SURAL - Lat Mall 60      Calf Ankle    5.0  14  38.0 28.9      Calf Ankle    5.0  14   28.9      Calf Ankle 3.54 4.53 3.0 5.0 4.4 14 39.5  27.4       Motor NCS      Nerve / Sites Rec. Site Lat Ref. Amp Ref. Rel Amp Dist Cal Ref. Dur. Area Temp.     ms ms mV mV % cm m/s m/s ms %  C   L MEDIAN - APB      Wrist APB 9.64 4.40 0.1 5.0 100 8   11.41  33.1   L ULNAR - ADM      Wrist ADM 3.28 3.50 3.7 5.0 100 8   4.64 100 33.1      B.Elbow ADM 6.98  3.3  91 20.5 55.4 48.0 4.64 98.4 33.1      A.Elbow ADM 9.06  3.2  87.9 11 52.8 48.0 4.74 97.9 33.5      Axilla ADM 10.83  3.0  83.1 9 50.8 48.0 5.10 93 33.4   L DEEP PERONEAL - EDB 60      Ankle EDB 5.10 6.00 5.0 2.0 100 8   4.74 100 31      FibHead EDB 13.54  3.2  64.2 32.5 38.5 38.0 5.89 93.7 31      Pop Fos EDB 15.99  3.0  60.5 10 40.9 38.0 5.99 93.6 31      Acc Peron EDB 5.05  5.3  105    5.05 107 31   L TIBIAL - AH      Ankle AH 6.15 6.00 6.3 4.0 100 8   3.39 100 31      Pop Fos AH 15.31  6.3  100 40 43.6 38.0 7.19 164 26.9       F  Wave      Nerve Min F Lat Max F Lat Mean FLat Temp.    ms ms ms  C   L ULNAR    33.1   L TIBIAL 58.12 62.50 59.40 27.1       EMG Summary Table     Spontaneous MUAP Recruitment    IA Fib PSW Fasc H.F. Amp Dur. PPP Pattern   L. TIB ANTERIOR N None None 1+ None 1+ 1+ N Reduced   L. GASTROCN (MED) N None 1+ 2+ (fast) None N N N N   L. VAST LATERALIS N None None 2+ (fast) None 2+ 1+ N Reduced   L. GLUTEUS MED N None None 2+ (fast) None 2+ 1+ N Reduced   L. THOR PSP (M) N None 2+ 2+ (slow) None N N N N   L. DELTOID N None None 2+ (fast) None 2+ 2+ N Reduced   L. TRICEPS N None None 2+ (fast) None 1+ 2+ 1+ Reduced   L. FIRST D INTEROSS N 3+ None 2+ (fast) None N N N    L. PRON TERES N None 2+ 2+ (fast) None N 1+ N Reduced

## 2020-09-17 NOTE — LETTER
9/17/2020       RE: Gera Garsia  [address] [facility] [address]     Dear Colleague,    Thank you for referring your patient, Gera Garsia, to the [facility] EMG at [facility]. Please see a copy of my visit note below.        [facility]  Electrodiagnostic Laboratory    Nerve Conduction & EMG Report    Patient:       Gera Garsia  Patient ID:    [id number]  Gender:        Male  YOB: 1955  Age:           65 Years 6 Months      Referring Provider: Dr. Ortiz    History & Examination:    Mr. Garsia is a 65-year-old man presenting with progressive weakness and fasciculations of the left upper limb now with bilateral lower limb fasciculations.  EMG/NCS was requested to evaluate for motor neuron disease.    Techniques: Motor and sensory conduction studies were done with surface recording electrodes. EMG was done with a concentric needle electrode.        Results:    Left median antidromic sensory nerve conduction study showed reduced SNAP amplitude and markedly attenuated conduction velocity.  Left ulnar and radial antidromic sensory nerve conduction studies were normal.  Left sural antidromic sensory nerve conduction study showed mildly attenuated SNAP amplitude and normal conduction velocity.  Left median motor nerve conduction study showed severely reduced distal amplitude with markedly prolonged distal latency.  Left ulnar motor nerve conduction study showed mild reduction in amplitude with normal distal latency and conduction velocities, and no segmental amplitude changes with proximal stimulation.  Left peroneal motor nerve conduction study (EDB) was normal.  Left tibial motor nerve conduction study was normal except for very mildly prolonged distal latency that was likely secondary to cold limb.  Left tibial F wave was normal.      EMG of the proximal and distal muscles of the left lower limb showed 1-2+ fasciculations in all muscles, with 1+  fibrillations in the gastrocnemius.  MUAPs showed increased amplitude and duration along with reduced recruitment in the left TA, VL, and gluteus medius.  Thoracic paraspinal muscles showed 2+ fibrillations and 2+ fasciculations.  EMG of the proximal and distal muscles of the left upper limb showed 2+ fasciculations in all muscles, and fibrillations/positive waves at the left FDI and pronator teres (3+ and 2+ respectively). MUAPs in this region showed increased amplitude and duration and reduced recruitment in the left deltoid, triceps, and pronator teres.    Interpretation:    This is an abnormal study.  There is electrophysiologic evidence of:    1) Widespread denervation changes in the cervical, thoracic, and lumbosacral myotomes. Abnormal spontaneous activity included prominent fasciculations in all muscles, whereas fibrillations/positive waves were seen in at least one muscle in each region. Chronic changes with motor unit remodeling were noted at the cervical and lumbosacral myotomes.  These findings localize to a disorder affecting the motor neurons, motor roots or their axons. There is no conduction block or other demyelinating features to suggest a diagnosis of multifocal motor neuropathy.   2) A moderate to severe left median neuropathy at the wrist as can be seen in carpal tunnel syndrome.  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    Comment: The patient will be scheduled for follow up appointment in the ALS Clinic.    EMG Physician:     Rai Terry MD  Neuromuscular Fellow    ATTENDING ADDENDUM: I was present during the entire study and directly supervised Fellow Dr Terry who performed it. I agree with the analysis of results and interpretation as above, which I personally reviewed and edited. Kamari Wolf MD      Sensory NCS      Nerve / Sites Rec. Site Onset Peak NP Amp Ref. PP Amp Dist Cal Ref. Temp     ms ms   V  V  V cm m/s m/s  C   L MEDIAN - Dig II Anti      Wrist Dig II 4.95 6.04 5.2 10.0 5.3 14 28.3 48.0 33.9   L ULNAR - Dig V Anti      Wrist Dig V 2.92 3.65 8.8 8.0 13.0 14 48.0 48.0 33.1   L RADIAL - Snuff      Forearm Snuff 1.82 2.50 17.3 15.0 19.0 10 54.9 48.0 32.5   L SURAL - Lat Mall 60      Calf Ankle    5.0  14  38.0 28.9      Calf Ankle    5.0  14   28.9      Calf Ankle 3.54 4.53 3.0 5.0 4.4 14 39.5  27.4       Motor NCS      Nerve / Sites Rec. Site Lat Ref. Amp Ref. Rel Amp Dist Cal Ref. Dur. Area Temp.     ms ms mV mV % cm m/s m/s ms %  C   L MEDIAN - APB      Wrist APB 9.64 4.40 0.1 5.0 100 8   11.41  33.1   L ULNAR - ADM      Wrist ADM 3.28 3.50 3.7 5.0 100 8   4.64 100 33.1      B.Elbow ADM 6.98  3.3  91 20.5 55.4 48.0 4.64 98.4 33.1      A.Elbow ADM 9.06  3.2  87.9 11 52.8 48.0 4.74 97.9 33.5      Axilla ADM 10.83  3.0  83.1 9 50.8 48.0 5.10 93 33.4   L DEEP PERONEAL - EDB 60      Ankle EDB 5.10 6.00 5.0 2.0 100 8   4.74 100 31      FibHead EDB 13.54  3.2  64.2 32.5 38.5 38.0 5.89 93.7 31      Pop Fos EDB 15.99  3.0  60.5 10 40.9 38.0 5.99 93.6 31      Acc Peron EDB 5.05  5.3  105    5.05 107 31   L TIBIAL - AH      Ankle AH 6.15 6.00 6.3 4.0 100 8   3.39 100 31      Pop Fos AH 15.31  6.3  100 40 43.6 38.0 7.19 164 26.9       F  Wave      Nerve Min F Lat Max F Lat Mean FLat Temp.    ms ms ms  C   L ULNAR    33.1   L TIBIAL 58.12 62.50 59.40 27.1       EMG Summary Table     Spontaneous MUAP Recruitment    IA Fib PSW Fasc H.F. Amp Dur. PPP Pattern   L. TIB ANTERIOR N None None 1+ None 1+ 1+ N Reduced   L. GASTROCN (MED) N None 1+ 2+ (fast) None N N N N   L. VAST LATERALIS N None None 2+ (fast) None 2+ 1+ N Reduced   L. GLUTEUS MED N None None 2+ (fast) None 2+ 1+ N Reduced   L. THOR PSP (M) N None 2+ 2+ (slow) None N N N N   L. DELTOID N None None 2+ (fast) None 2+ 2+ N Reduced   L. TRICEPS N None None 2+ (fast) None 1+ 2+ 1+ Reduced   L. FIRST D INTEROSS N 3+ None 2+ (fast) None N N N    L. PRON TERES N  None 2+ 2+ (fast) None N 1+ N Reduced                                Again, thank you for allowing me to participate in the care of your patient.      Sincerely,    Kamari Wolf MD

## 2020-10-01 ENCOUNTER — THERAPY VISIT (OUTPATIENT)
Dept: OCCUPATIONAL THERAPY | Facility: CLINIC | Age: 65
End: 2020-10-01
Payer: COMMERCIAL

## 2020-10-01 ENCOUNTER — OFFICE VISIT (OUTPATIENT)
Dept: NEUROLOGY | Facility: CLINIC | Age: 65
End: 2020-10-01
Payer: COMMERCIAL

## 2020-10-01 VITALS
HEART RATE: 84 BPM | OXYGEN SATURATION: 99 % | BODY MASS INDEX: 22.8 KG/M2 | WEIGHT: 155 LBS | RESPIRATION RATE: 16 BRPM | SYSTOLIC BLOOD PRESSURE: 136 MMHG | DIASTOLIC BLOOD PRESSURE: 73 MMHG

## 2020-10-01 DIAGNOSIS — G62.9 SENSORY NEUROPATHY: ICD-10-CM

## 2020-10-01 DIAGNOSIS — G12.21 ALS (AMYOTROPHIC LATERAL SCLEROSIS) (H): ICD-10-CM

## 2020-10-01 DIAGNOSIS — G12.21 ALS (AMYOTROPHIC LATERAL SCLEROSIS) (H): Primary | ICD-10-CM

## 2020-10-01 DIAGNOSIS — Z78.9 IMPAIRED INSTRUMENTAL ACTIVITIES OF DAILY LIVING (IADL): ICD-10-CM

## 2020-10-01 DIAGNOSIS — R29.898 WEAKNESS OF LEFT ARM: Primary | ICD-10-CM

## 2020-10-01 DIAGNOSIS — Z78.9 DECREASED ACTIVITIES OF DAILY LIVING (ADL): ICD-10-CM

## 2020-10-01 DIAGNOSIS — R29.898 LEFT HAND WEAKNESS: Primary | ICD-10-CM

## 2020-10-01 LAB
CRP SERPL-MCNC: <2.9 MG/L (ref 0–8)
EXPTIME-PRE: 8.72 SEC
FEF2575-%PRED-PRE: 120 %
FEF2575-PRE: 3.19 L/SEC
FEF2575-PRED: 2.65 L/SEC
FEFMAX-%PRED-PRE: 87 %
FEFMAX-PRE: 7.67 L/SEC
FEFMAX-PRED: 8.74 L/SEC
FEV1-%PRED-PRE: 97 %
FEV1-PRE: 3.29 L
FEV1FEV6-PRE: 80 %
FEV1FEV6-PRED: 78 %
FEV1FVC-PRE: 78 %
FEV1FVC-PRED: 77 %
FIFMAX-PRE: 6.58 L/SEC
FVC-%PRED-PRE: 95 %
FVC-PRE: 4.2 L
FVC-PRED: 4.41 L
MEP-PRE: 133 CMH2O
MIP-PRE: -83 CMH2O
VIT B12 SERPL-MCNC: 632 PG/ML (ref 193–986)

## 2020-10-01 PROCEDURE — 86140 C-REACTIVE PROTEIN: CPT | Mod: 90 | Performed by: PATHOLOGY

## 2020-10-01 PROCEDURE — 94375 RESPIRATORY FLOW VOLUME LOOP: CPT | Performed by: INTERNAL MEDICINE

## 2020-10-01 PROCEDURE — 82784 ASSAY IGA/IGD/IGG/IGM EACH: CPT | Mod: 90 | Performed by: PSYCHIATRY & NEUROLOGY

## 2020-10-01 PROCEDURE — 99215 OFFICE O/P EST HI 40 MIN: CPT | Mod: GC | Performed by: PSYCHIATRY & NEUROLOGY

## 2020-10-01 PROCEDURE — 99000 SPECIMEN HANDLING OFFICE-LAB: CPT | Performed by: PSYCHIATRY & NEUROLOGY

## 2020-10-01 PROCEDURE — 86334 IMMUNOFIX E-PHORESIS SERUM: CPT | Mod: 90 | Performed by: PATHOLOGY

## 2020-10-01 PROCEDURE — 82607 VITAMIN B-12: CPT | Performed by: PATHOLOGY

## 2020-10-01 PROCEDURE — 36415 COLL VENOUS BLD VENIPUNCTURE: CPT | Performed by: PATHOLOGY

## 2020-10-01 PROCEDURE — 97535 SELF CARE MNGMENT TRAINING: CPT | Mod: GO | Performed by: OCCUPATIONAL THERAPIST

## 2020-10-01 PROCEDURE — 97165 OT EVAL LOW COMPLEX 30 MIN: CPT | Mod: GO | Performed by: OCCUPATIONAL THERAPIST

## 2020-10-01 ASSESSMENT — REVISED AMYOTROPHIC LATERAL SCLEROSIS FUNCTIONAL RATING SCALE (ALSFRS-R)
SPEECH: NORMAL SPEECH PROCESSES
ALSFRS_TOTAL_SCORE: 46
SIX_ITEM_SUBTOTAL: 23
RESPIRATORY_SUBTOTAL_SCORE: 12
GROSS_MOTOR_SUBTOTAL_SCORE: 12
HANDWRITING: NORMAL
DRESSING_AND_HYGEINE: INDEPENDENT AND COMPLETE SELF-CARE WITH EFFORT OR DECREASED EFFICIENCY
SPEECH: 4
CLIMBING_STAIRS: NORMAL
TURNING_IN_BED_AND_ADJUSTING_BED_CLOTHES: 4
CUTTING_FOOD_AND_HANDLING_UTENSILES: 3
WALKING: NORMAL
TURNING_IN_BED_AND_ADJUSTING_BED_CLOTHES: NORMAL
ORTHOPENA: 4
CLIMBING_STAIRS: 4
DYSPNEA: 4
HANDWRITING: 4
BULBAR_SUBTOTAL: 12
WALKING: 4
SALIVATION: 4
RESPIRATORY_INSUFFICIENCY: 4
DRESSING_AND_HYGEINE: 3
SWALLOWING: NORMAL EATING HABITS
SALIVATION: NORMAL
SWALLOWING: 4
FINE_MOTOR_SUBTOTAL_SCORE: 10

## 2020-10-01 NOTE — PATIENT INSTRUCTIONS
Today we discussed the most likely diagnosis of ALS. We also discussed treatment options including Riluzole and Radicava. We will order some blood tests today and an MRI of the C spine.  We will plan to start medication after the results of these tests.  We will also have our occupational therapist see you today for hand weakness. Pulmonary function tests today. A registered dietician will call you to discuss optimizing nutrition.    Please call [facility] @ [phone] for questions or concerns during regular business hours. For a more efficient way to communicate, use SumAll and address the message to your physician. Remember, SumAll is only read during business hours. Do not leave urgent messages on voicemail or SumAll. If situation is urgent, contact the Neurology Clinic @ [phone] and ask to speak to a Triage Nurse or Call 911 or visit an Emergency Department.     Please call your pharmacy if you need a medication refill. They will send us an electronic message.

## 2020-10-01 NOTE — PROGRESS NOTES
"    OUTPATIENT OCCUPATIONAL THERAPY CLINIC NOTE  Gera Garsia  YOB: 1955  [id number]    Type of visit:  Evaluation            Date of service: 10/1/2020    Referring provider: Dr. Kamari Wolf     present: No  Language: english    Others present at visit:   significant otherMichell    Medical diagnosis:   Amyotrophic lateral sclerosis (ALS)     Date of diagnosis: 10/01/20     Pertinent medical history:  Onset of fasiculations in L upper limb 1 year ago and then spread to R and then developed distal Upper limb weakness and trouble with buttoning. Now troubles with lifting.     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    Cardio-respiratory status:  Forced vital capacity: NT prior to OT appt today % of predicted     Height/Weight: 5' 9\" / 157 lb    Living environment:  [place], [place]    Living environment barriers:  3 stairs to enter (no railings present)  Full stairs within home (no railing present but has and can re-install to basement and has rail to upstairs    3 story               BED AND BATH ON MAIN, TUB/SHOWER , NO BARS    Current assistance/living environment:  Lives alone, S.O. with pt a lot when not working from her home      Current mobility equipment:  None    Current ADL equipment:  None     Technology used: computer, tends to use minimally and \"hunts and pecks with index fingers\" at baseline    Patient concerns/goals: Problems with fine motor control and with lifting. Some problems with turning due to neuropathy in legs per MD, some cramping. Weight loss of 20#. Opening packages is difficult    Evaluation   Interview completed.   Pain assessment:  Pain denied    Range of motion:  UE: WFL with exception L thumb opposition due to intrinsic weakness with thumb webspace atrophy    Manual " muscle testing: UE: See neurology note. Manual  force weak on L as compared to R. Lateral pinch is weak on L as compared to R and palmar pinch very difficult to achieve on L and R is WFL    Cognition:  WFL per observation today    ADL:   Feeding self:  Cutting food hard due to holding fork on L hand is difficult  Grooming: Ind  UE Dressing:  Buttons hard with weak L hand  LE Dressing:  Buttons hard, socks ok, tying laces is hard  Showering/bathing: Ind  Toileting/transfer:  Ind    IADL:   Home management:  Does all, vacuum system for leaves, lifting system can be harder, feels a little weaker  Driving:  Automatic, drives fine per report  Occupation: retired, worked for [employer] in [employer]  Emergency Call system:phone      Fall Risk Screen:   Has the patient fallen 2 or more times in the last year? No      Has the patient fallen and had an injury in the past year? No       Timed Up and Go Score: NT    Is the patient a fall risk? No     Impairments:  Fatigue  Muscle atrophy  Coordination  Range of motion     Treatment diagnosis:  Impaired activities of daily living  Impaired IADL    Assessment of Occupational Performance: 1-3 performance deficits  Identified Performance Deficits (ie: feeding, social skills): dressing, eating, home management, yard care  Clinical Decision Making (Complexity): Low complexity     Recommendations/Plan of care:  Patient would benefit from interventions to enhance safety and independence.  Rehab potential good for stated goals.  Occupational therapy intervention for  self care/home management.  1 session evaluation & treatment.    Goals:   Target date: today  Patient, family and/or caregiver will verbalize understanding of evaluation results and implications for functional performance.  Patient, family and/or caregiver will verbalize/demonstrate understanding of compensatory methods /equipment to enhance functional independence and safety.  Patient, family and/or caregiver will  verbalize/demonstrate understanding of positioning techniques/equipment.      Educational assessment/barriers to learning:  No barriers noted      Treatment provided this date:   Self care/home management, 25 minutes of training in:  -AE to compensate for reduce hand function on L side:  Button hook-trialed today  Elastic laces for shoes  Large suction cup nail clipper board  *all 3 items requested from [facility] today with pt and s.o other trained in ALS as resource for AE and DME loan and in website for information, alsmn.org and how to access navigation tool for accurate information on ALS. Pt in agreement to this therapist contacting [facility] about registering pt and educating about further services available for support.  -Custom palmar abd thumb splint to aid pinch force, simulated purpose by stablaizing thumb for pt and he had significant palmar pinch force on L side. Requested Dr. Wolf place orders for outpatient hand therapy for custom fabrication of splint  -considerations for managing energy with exercise and activity such as bike riding that pt enjoys doing and to watch for signs for fatigue such as muscle fasciculations, excess fatigue following activity that interferes with function, current research related to ALS and exercise     Response to treatment/recommendations: very receptive, as was his s.o.    Goal attainment:  All goals met    Risks and benefits of evaluation/treatment have been explained.  Patient, family and/or caregiver are in agreement with Plan of Care.     Timed Code Treatment Minutes: 25  Total Treatment Time (sum of timed and untimed services): 35    Signature: IRENE Hollins, MSCS   Date: 10/1/2020

## 2020-10-01 NOTE — LETTER
"10/1/2020       RE: Gera Garsia  [address] [facility] [address]     Dear Colleague,    Thank you for referring your patient, Gera Garsia, to the [facility] NEUROLOGY CLINIC [place] at [facility]. Please see a copy of my visit note below.    Neuromuscular Consult Note    Chief Complaint: Weakness    History of Present Illness:    Mr. Garsia is a 65 year old right handed male with h/o arthritis who presents to the ALS clinic for evaluation of progressive left arm weakness. He is accompanied by his girlfriend today.  He first developed muscle twitching in the proximal left upper limbs about 1 year ago. This quickly spread to the proximal right upper limb. He then developed twitching in the proximal lower limbs about 3 months ago. First on the right then spreading to the left. Within the last year he has noted weakness and atrophy in the left hand. This has made it difficult to button his shirt, gripping items, and opening zip-lock bags. He has some difficulty with using utensils but completes most of his ADLs using the right hand. He also noted difficulty lifting heavy objects with the left arm.  No weakness in the right arm and bilateral lower extremities. He denies any falls or difficulty walking. He denied any difficulty swallowing or changes in speech. No shortness of breath at rest or with exertion. No orthopnea.  No sialorrhea. He has noted about a 20lb weight loss over the last 2 years. He does have mild cramps in his arms and legs which is not too bothersome and is improving with magnesium. He denied any symptoms concerning for depression or pseudobulbar affect. No cognitive concerns.     Prior pertinent laboratory work-up:  June 2020  CK: 451  TSH: wnl  Glucose: 91    March 2019  Hep C ab: nonreactive    Prior pertinent imaging work-up:  None    Prior electrophysiologic work-up:  09/17/20  Nerve conduction studies/needle EMG by Dr. Wolf: \"1) " "Widespread denervation changes in the cervical, thoracic, and lumbosacral myotomes. Abnormal spontaneous activity included prominent fasciculations in all muscles, whereas fibrillations/positive waves were seen in at least one muscle in each region. Chronic changes with motor unit remodeling were noted at the cervical and lumbosacral myotomes.  These findings localize to a disorder affecting the motor neurons, motor roots or their axons. There is no conduction block or other demyelinating features to suggest a diagnosis of multifocal motor neuropathy. 2) A moderate to severe left median neuropathy at the wrist as can be seen in carpal tunnel syndrome. 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, based on mildly attenuated sural SNAP amplitude for age.\"    Past Medical History:   Past Medical History:   Diagnosis Date     Arthritis      Macular pigment deposit, os      Post-splenectomy     s/p trauma     Problems with hearing     bilat hearing aids     Past Surgical History:  Past Surgical History:   Procedure Laterality Date     ABDOMEN SURGERY      x 2, once as a , and once for spenectomy      COLONOSCOPY WITH CO2 INSUFFLATION N/A 2019    Procedure: COLONOSCOPY, WITH CO2 INSUFFLATION;  Surgeon: Ambrosio Bonilla MD;  Location: [facility] OR     ENT SURGERY       ORTHOPEDIC SURGERY      AC joint separation repair- complicated with infection     Family history:    There is no known family history of neuromuscular disorders.    Social History:    Pt denies tobacco, alcohol, or illicit drug use. There is no known exposure to toxins or heavy metals.  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      Medical Allergies:    No Known Allergies     Current Medications:  Current Outpatient Medications:      fish oil-omega-3 fatty acids 1000 MG capsule, , Disp: , Rfl:     Current Facility-Administered Medications:      ropivacaine (NAROPIN) " injection 3 mL, 3 mL, , , Billy Soto, DO, 3 mL at 03/22/19 1115     ropivacaine (NAROPIN) injection 3 mL, 3 mL, , , Billy Sotoopher, DO, 3 mL at 03/22/19 1115     triamcinolone (KENALOG-40) injection 40 mg, 40 mg, , , Billy Soto, DO, 40 mg at 03/22/19 1115     triamcinolone (KENALOG-40) injection 40 mg, 40 mg, , , Charles, Billy Lanza, DO, 40 mg at 03/22/19 1115    Facility-Administered Medications Ordered in Other Visits:      sodium chloride (PF) 0.9% PF flush 3 mL, 3 mL, Intracatheter, Q8H, Bradly Espinoza MD, 20 mL at 04/28/15 1832     Review of Systems: A complete review of systems was obtained and was negative except for what was noted above.    Physical examination:    There were no vitals taken for this visit.  General Appearance: NAD    Skin: There are no rashes or other skin lesions.    Musculoskeletal:  There is no scoliosis, lordosis, kyphosis, pes cavus, or hammertoes.    Neurologic examination:    Mental status:  Patient is alert, attentive, and oriented x 3.  Language is coherent and fluent without dysarthria or aphasia.  Memory, comprehension and ability to follow commands were intact.       Cranial nerves: Pupils were round and reacted to light.  Extraocular movements were full. There was no face, jaw, palate or tongue weakness or atrophy. Hearing was grossly intact.  Shoulder shrug was normal.      Motor exam: Fasciculations in proximal upper and lower limbs bilaterally.  Severe atrophy of FDI and APB in the left hand, mild on the right. Manual muscle testing revealed the following MRC grade muscle power:   Right Left   Neck flexion 5    Neck extension 5    Shoulder abduction 5 5   Elbow extension 5 5   Elbow flexion  5 5   Wrist extension 5 4+   Finger extension 5 4   FDI 4 1   APB 4 2   Hip flexion 5 5   Knee extension 5 5   Knee flexion 5 5   Dorsiflexion 5 5   Plantarflexion 5 5   Eversion 5 5   Inversion 5 5   Ext Hallucis Longus 5 5     Complex  motor skills revealed normal coordination in the right upper limb, moderate difficulty with finger tapping on the left. Toe tapping normal bilaterally.      Sensory exam revealed vibration to be 6s/7s at the toes, >20s at the fingers. PP normal.    Gait was normal.  Pt was able to walk on heels, toes. Difficulty with tandem.        Deep tendon reflexes:   Right Left   Triceps 3 3   Biceps 2 3   Brachioradialis 2 3   Knee jerk 3 3   Ankle jerk 2 2   Plantar responses were mute bilaterally. Chaparro's present on the right, not on the left. No jaw jerk.     PFTs (10/1/20): FVC 95%, , MIP -83     Assessment:    Mr. Garsia is a 65 year old right handed male with h/o arthritis who presents to the ALS clinic for evaluation of progressive left arm weakness.    1) Limb onset ALS: History, exam, and findings on EMG are most consistent with a diagnosis of ALS. We discussed the diagnosis of ALS and reviewed the typical progression of disease. At this time he does not have bulbar or respiratory involvement. PFTs today showed good pulmonary function. We reviewed medication options including Riluzole and Radicava. He is interested in starting both of these medications however would like to start after labs and MRI C spine is completed. We discussed that these studies will likely be unremarkable. He does have some difficulty with his ADLs when using the left hand and will have our occupational therapist evaluate today. He has not PT or SLP needs today. He has noted about a 20lb weight loss over the last 2 years. We did review the importance of a high calorie diet and maintaining weight in ALS. We will place a nutrition consult to discuss optimizing diet.    Plan:      1. Labs: B12, immunofixation, CRP (due to mild sensory neuropathy noted on EMG and clinically)  2. Imaging: MRI C spine w/wo contrast- rule out cervical myelopathy/polyradiculopathy mimicking ALS  3. OT referral for hand therapy  4. Nutrition referral for weight  loss  5. Will plan to start Riluzole and Radicava if labs and MRI are unremarkable   6. Follow up in 3 months    Patient seen and examined with attending neurologist, Dr. Wolf, who agrees with above.     Rai Terry MD  Neuromuscular Fellow    ATTENDING ADDENDUM: Patient seen and examined with Fellow Dr Terry today. Agree with his assessment and plan as above. TT spent for patient care 45 minutes; more than half was counseling.     Again, thank you for allowing me to participate in the care of your patient.  Sincerely,    Kamari Wolf MD

## 2020-10-01 NOTE — PROGRESS NOTES
"Neuromuscular Consult Note    Chief Complaint: Weakness    History of Present Illness:    Mr. Garsia is a 65 year old right handed male with h/o arthritis who presents to the ALS clinic for evaluation of progressive left arm weakness. He is accompanied by his girlfriend today.  He first developed muscle twitching in the proximal left upper limbs about 1 year ago. This quickly spread to the proximal right upper limb. He then developed twitching in the proximal lower limbs about 3 months ago. First on the right then spreading to the left. Within the last year he has noted weakness and atrophy in the left hand. This has made it difficult to button his shirt, gripping items, and opening zip-lock bags. He has some difficulty with using utensils but completes most of his ADLs using the right hand. He also noted difficulty lifting heavy objects with the left arm.  No weakness in the right arm and bilateral lower extremities. He denies any falls or difficulty walking. He denied any difficulty swallowing or changes in speech. No shortness of breath at rest or with exertion. No orthopnea.  No sialorrhea. He has noted about a 20lb weight loss over the last 2 years. He does have mild cramps in his arms and legs which is not too bothersome and is improving with magnesium. He denied any symptoms concerning for depression or pseudobulbar affect. No cognitive concerns.     Prior pertinent laboratory work-up:  June 2020  CK: 451  TSH: wnl  Glucose: 91    March 2019  Hep C ab: nonreactive    Prior pertinent imaging work-up:  None    Prior electrophysiologic work-up:    09/17/20  Nerve conduction studies/needle EMG by Dr. Wolf: \"1) Widespread denervation changes in the cervical, thoracic, and lumbosacral myotomes. Abnormal spontaneous activity included prominent fasciculations in all muscles, whereas fibrillations/positive waves were seen in at least one muscle in each region. Chronic changes with motor unit remodeling were " "noted at the cervical and lumbosacral myotomes.  These findings localize to a disorder affecting the motor neurons, motor roots or their axons. There is no conduction block or other demyelinating features to suggest a diagnosis of multifocal motor neuropathy. 2) A moderate to severe left median neuropathy at the wrist as can be seen in carpal tunnel syndrome. 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, based on mildly attenuated sural SNAP amplitude for age.\"    Past Medical History:   Past Medical History:   Diagnosis Date     Arthritis      Macular pigment deposit, os      Post-splenectomy     s/p trauma     Problems with hearing     bilat hearing aids     Past Surgical History:  Past Surgical History:   Procedure Laterality Date     ABDOMEN SURGERY      x 2, once as a , and once for spenectomy      COLONOSCOPY WITH CO2 INSUFFLATION N/A 2019    Procedure: COLONOSCOPY, WITH CO2 INSUFFLATION;  Surgeon: Ambrosio Bonilla MD;  Location: [facility] OR     ENT SURGERY       ORTHOPEDIC SURGERY      AC joint separation repair- complicated with infection     Family history:    There is no known family history of neuromuscular disorders.    Social History:    Pt denies tobacco, alcohol, or illicit drug use. There is no known exposure to toxins or heavy metals.  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      Medical Allergies:    No Known Allergies     Current Medications:     Current Outpatient Medications:      fish oil-omega-3 fatty acids 1000 MG capsule, , Disp: , Rfl:     Current Facility-Administered Medications:      ropivacaine (NAROPIN) injection 3 mL, 3 mL, , , Billy Soto DO, 3 mL at 19     ropivacaine (NAROPIN) injection 3 mL, 3 mL, , , Billy Soto DO, 3 mL at 19 111     triamcinolone (KENALOG-40) injection 40 mg, 40 mg, , , Billy Soto DO, 40 mg at 19 1115     " triamcinolone (KENALOG-40) injection 40 mg, 40 mg, , , Billy Soto, DO, 40 mg at 03/22/19 1115    Facility-Administered Medications Ordered in Other Visits:      sodium chloride (PF) 0.9% PF flush 3 mL, 3 mL, Intracatheter, Q8H, Bradly Espinoza MD, 20 mL at 04/28/15 1832     Review of Systems: A complete review of systems was obtained and was negative except for what was noted above.    Physical examination:    There were no vitals taken for this visit.  General Appearance: NAD    Skin: There are no rashes or other skin lesions.    Musculoskeletal:  There is no scoliosis, lordosis, kyphosis, pes cavus, or hammertoes.    Neurologic examination:    Mental status:  Patient is alert, attentive, and oriented x 3.  Language is coherent and fluent without dysarthria or aphasia.  Memory, comprehension and ability to follow commands were intact.       Cranial nerves: Pupils were round and reacted to light.  Extraocular movements were full. There was no face, jaw, palate or tongue weakness or atrophy. Hearing was grossly intact.  Shoulder shrug was normal.      Motor exam: Fasciculations in proximal upper and lower limbs bilaterally.  Severe atrophy of FDI and APB in the left hand, mild on the right. Manual muscle testing revealed the following MRC grade muscle power:   Right Left   Neck flexion 5    Neck extension 5    Shoulder abduction 5 5   Elbow extension 5 5   Elbow flexion  5 5   Wrist extension 5 4+   Finger extension 5 4   FDI 4 1   APB 4 2   Hip flexion 5 5   Knee extension 5 5   Knee flexion 5 5   Dorsiflexion 5 5   Plantarflexion 5 5   Eversion 5 5   Inversion 5 5   Ext Hallucis Longus 5 5     Complex motor skills revealed normal coordination in the right upper limb, moderate difficulty with finger tapping on the left. Toe tapping normal bilaterally.      Sensory exam revealed vibration to be 6s/7s at the toes, >20s at the fingers. PP normal.    Gait was normal.  Pt was able to walk on heels,  toes. Difficulty with tandem.        Deep tendon reflexes:   Right Left   Triceps 3 3   Biceps 2 3   Brachioradialis 2 3   Knee jerk 3 3   Ankle jerk 2 2   Plantar responses were mute bilaterally. Chaparro's present on the right, not on the left. No jaw jerk.     PFTs (10/1/20): FVC 95%, , MIP -83     Assessment:    Mr. Garsia is a 65 year old right handed male with h/o arthritis who presents to the ALS clinic for evaluation of progressive left arm weakness.    1) Limb onset ALS: History, exam, and findings on EMG are most consistent with a diagnosis of ALS. We discussed the diagnosis of ALS and reviewed the typical progression of disease. At this time he does not have bulbar or respiratory involvement. PFTs today showed good pulmonary function. We reviewed medication options including Riluzole and Radicava. He is interested in starting both of these medications however would like to start after labs and MRI C spine is completed. We discussed that these studies will likely be unremarkable. He does have some difficulty with his ADLs when using the left hand and will have our occupational therapist evaluate today. He has not PT or SLP needs today. He has noted about a 20lb weight loss over the last 2 years. We did review the importance of a high calorie diet and maintaining weight in ALS. We will place a nutrition consult to discuss optimizing diet.    Plan:      1. Labs: B12, immunofixation, CRP (due to mild sensory neuropathy noted on EMG and clinically)  2. Imaging: MRI C spine w/wo contrast- rule out cervical myelopathy/polyradiculopathy mimicking ALS  3. OT referral for hand therapy  4. Nutrition referral for weight loss  5. Will plan to start Riluzole and Radicava if labs and MRI are unremarkable   6. Follow up in 3 months    Patient seen and examined with attending neurologist, Dr. Wolf, who agrees with above.     Rai Terry MD  Neuromuscular Fellow    ATTENDING ADDENDUM: Patient seen and  examined with Fellow Dr Terry today. Agree with his assessment and plan as above. TT spent for patient care 45 minutes; more than half was counseling. Kamari Wolf MD

## 2020-10-02 LAB
IGA SERPL-MCNC: 351 MG/DL (ref 84–499)
IGG SERPL-MCNC: 1514 MG/DL (ref 610–1616)
IGM SERPL-MCNC: 79 MG/DL (ref 35–242)
PROT PATTERN SERPL IFE-IMP: NORMAL

## 2020-10-07 ENCOUNTER — THERAPY VISIT (OUTPATIENT)
Dept: OCCUPATIONAL THERAPY | Facility: CLINIC | Age: 65
End: 2020-10-07
Attending: PSYCHIATRY & NEUROLOGY
Payer: COMMERCIAL

## 2020-10-07 DIAGNOSIS — R29.898 LEFT HAND WEAKNESS: ICD-10-CM

## 2020-10-07 DIAGNOSIS — G12.21 ALS (AMYOTROPHIC LATERAL SCLEROSIS) (H): ICD-10-CM

## 2020-10-07 PROCEDURE — 97760 ORTHOTIC MGMT&TRAING 1ST ENC: CPT | Mod: GO | Performed by: OCCUPATIONAL THERAPIST

## 2020-10-07 NOTE — PROGRESS NOTES
Hand Therapy Initial Evaluation    Current Date:  10/7/2020    Subjective:  Gera Garsia is a 65 year old right hand dominant male.    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 Since onset symptoms are gradually getting worse.  Special tests:  none.  Previous treatment: seen by OT for ADL eval and recommended thumb orthosis.  General health as reported by patient is excellent.  Pertinent medical history includes:None  Medical allergies:none.  Surgical history: none.  Medication history: None.    Occupational Profile Information:  Current occupation is Retired   Home Tasks: Driving, Lifting, Carrying, Pushing, Pulling  Prior functional level:  independent-all household chores  Barriers include:none  Mobility: No difficulty  Transportation: drives    Functional Outcome Measure:  Upper Extremity Functional Index  SCORE:   Column Totals: 72/80  (A lower score indicates greater disability.)     Objective:  Pain Level (Scale 0-10)   10/7/2020   At Rest 0   With Use 0   Reports no pain    Edema    None      Sensation    WNL throughout all nerve distributions; per patient report    ROM   Significant thenar and thumb adductor atrophy, unable to oppose thumb to fingers due to weakness    Strength   (Measured in pounds)  Pain Report: - none  + mild    ++ moderate    +++ severe    10/7/2020 10/7/2020   Trials Right Left   1 78 36     Lat Pinch 10/7/2020 10/7/2020   Trials Right Left   1 18 8     3 Pt Pinch 10/7/2020 10/7/2020   Trials Right Left   1 15 unable     Assessment/Plan:  Patient's limitations or Problem List includes:  Decreased ROM/motion, Weakness and Decreased pinch of the left thumb which interferes with the patient's ability to perform Self Care Tasks (dressing) and Household Chores as compared to previous level  of function.    Rehab Potential:  Good - Return to full activity, some limitations    Patient will benefit from skilled Occupational Therapy to increase stability of thumb to return to previous activity level and resume normal daily tasks and to reach their rehab potential.    Barriers to Learning:  No barrier    Communication Issues:  Patient appears to be able to clearly communicate and understand verbal and written communication and follow directions correctly.    Chart Review: Chart Review and Simple history review with patient    Identified Performance Deficits: dressing, home establishment and management and meal preparation and cleanup    Assessment of Occupational Performance:  3-5 Performance Deficits    Clinical Decision Making (Complexity): Low complexity    Treatment Explanation:  The following has been discussed with the patient:  RX ordered/plan of care  Anticipated outcomes  Possible risks and side effects    Treatment Plan:    Frequency:  1 x visit  Duration:  NA; 1 x visit    Orthotic Fabrication:  Hand based orthosis    Discharge Plan:  Achieve all LTG.  Independent in home treatment program.  Reach maximal therapeutic benefit.    Home Exercise Program:  Wear HBTS orthosis to improve thumb position with ADL's

## 2020-10-26 ENCOUNTER — ANCILLARY PROCEDURE (OUTPATIENT)
Dept: MRI IMAGING | Facility: CLINIC | Age: 65
End: 2020-10-26
Attending: PSYCHIATRY & NEUROLOGY
Payer: COMMERCIAL

## 2020-10-26 DIAGNOSIS — G12.21 ALS (AMYOTROPHIC LATERAL SCLEROSIS) (H): ICD-10-CM

## 2020-10-26 DIAGNOSIS — R29.898 LEFT HAND WEAKNESS: ICD-10-CM

## 2020-10-26 PROCEDURE — 72156 MRI NECK SPINE W/O & W/DYE: CPT | Mod: GC | Performed by: RADIOLOGY

## 2020-10-26 PROCEDURE — A9585 GADOBUTROL INJECTION: HCPCS | Performed by: RADIOLOGY

## 2020-10-26 RX ORDER — GADOBUTROL 604.72 MG/ML
7.5 INJECTION INTRAVENOUS ONCE
Status: COMPLETED | OUTPATIENT
Start: 2020-10-26 | End: 2020-10-26

## 2020-10-26 RX ADMIN — GADOBUTROL 7 ML: 604.72 INJECTION INTRAVENOUS at 16:25

## 2020-10-26 NOTE — DISCHARGE INSTRUCTIONS
MRI Contrast Discharge Instructions    The IV contrast you received today will pass out of your body in your  urine. This will happen in the next 24 hours. You will not feel this process.  Your urine will not change color.    Drink at least 4 extra glasses of water or juice today (unless your doctor  has restricted your fluids). This reduces the stress on your kidneys.  You may take your regular medicines.    If you are on dialysis: It is best to have dialysis today.    If you have a reaction: Most reactions happen right away. If you have  any new symptoms after leaving the hospital (such as hives or swelling),  call your hospital at the correct number below. Or call your family doctor.  If you have breathing distress or wheezing, call 911.    Special instructions: ***    I have read and understand the above information.    Signature:______________________________________ Date:___________    Staff:__________________________________________ Date:___________     Time:__________    [facility] Departments:    ___[place]: [phone]  ___[facility]: [phone]  ___[place]: [phone] ___[facility]: [phone]  ___[facility]: [phone]  ___[facility]: [phone]  ___[phone]  ___[facility]: [phone]  ___[phone]

## 2020-11-05 ENCOUNTER — TELEPHONE (OUTPATIENT)
Dept: NEUROLOGY | Facility: CLINIC | Age: 65
End: 2020-11-05

## 2020-11-05 NOTE — TELEPHONE ENCOUNTER
Nutrition services:     Called Jim today per request of RNCC from ALS clinic to review nutrition needs.     Spoke with Michell, his wife.      Answered her questions on calorie, protein and vitamin needs.    Provided nutrition recommendations for weight maintenance/weight gain.      Secure email sent with the following information:    'Here are the resources that I referred to during my conversation with Michell:   --Tips to increase calories in Diet   --High Calorie/high protein recipes     Here are some shake options to try:   --Boost Mobility - 180 calories, 20g protein, 5000mg Collagen   --Boost Plus - 350 calories, 15g protein   --Ensure Enlive or Ensure plus - 350 calories, 15-20g protein     Here are Gera's nutrition needs for maintenance/weight gain:   Calories: 2568-9096/day   Protein: 85 grams/day   Vitamin D3 - 2000-4000IU (50-100mcg)/day   Please keep in touch with further nutrition concerns or questions along the way! It was nice meeting you over the phone.'      Shari Phillips RD,   [facility]  [phone]

## 2020-12-20 ENCOUNTER — HEALTH MAINTENANCE LETTER (OUTPATIENT)
Age: 65
End: 2020-12-20

## 2020-12-29 ENCOUNTER — VIRTUAL VISIT (OUTPATIENT)
Dept: URGENT CARE | Facility: CLINIC | Age: 65
End: 2020-12-29
Payer: COMMERCIAL

## 2020-12-29 DIAGNOSIS — R50.9 FEVER, UNSPECIFIED FEVER CAUSE: Primary | ICD-10-CM

## 2020-12-29 DIAGNOSIS — R50.9 FEVER, UNSPECIFIED FEVER CAUSE: ICD-10-CM

## 2020-12-29 PROCEDURE — 99213 OFFICE O/P EST LOW 20 MIN: CPT | Mod: 95 | Performed by: EMERGENCY MEDICINE

## 2020-12-29 PROCEDURE — U0003 INFECTIOUS AGENT DETECTION BY NUCLEIC ACID (DNA OR RNA); SEVERE ACUTE RESPIRATORY SYNDROME CORONAVIRUS 2 (SARS-COV-2) (CORONAVIRUS DISEASE [COVID-19]), AMPLIFIED PROBE TECHNIQUE, MAKING USE OF HIGH THROUGHPUT TECHNOLOGIES AS DESCRIBED BY CMS-2020-01-R: HCPCS | Performed by: EMERGENCY MEDICINE

## 2020-12-29 NOTE — PROGRESS NOTES
HPI: Patient is a 65-year-old male whose been ill for 2 days.  On Sunday he developed a fever of 102 which persisted yesterday.  Today he is afebrile with a T-max of 98.3.  He is noted some coughing and fatigue.  He has very slight dyspnea on exertion but comfortable breathing at rest.      ROS: See HPI otherwise normal.    No Known Allergies   Current Outpatient Medications   Medication Sig Dispense Refill     fish oil-omega-3 fatty acids 1000 MG capsule        riluzole (RILUTEK) 50 MG tablet Take 1 tablet (50 mg) by mouth every 12 hours 60 tablet 3         PE: No acute distress and nondyspneic sounding on the phone.        TREATMENT: None      ASSESSMENT: Fever and related upper respiratory symptoms.  No severe associated concerning symptoms.  Covid testing indicated.      DIAGNOSIS: Fever.  URI.      PLAN: Covid PCR ordered.  Testing team to follow.  Follow-up with PCP if any worsening symptoms or concerns.    Time: 10 minutes

## 2020-12-30 LAB
SARS-COV-2 RNA SPEC QL NAA+PROBE: NOT DETECTED
SPECIMEN SOURCE: NORMAL

## 2020-12-31 ENCOUNTER — TELEPHONE (OUTPATIENT)
Dept: FAMILY MEDICINE | Facility: CLINIC | Age: 65
End: 2020-12-31

## 2020-12-31 ENCOUNTER — VIRTUAL VISIT (OUTPATIENT)
Dept: NEUROLOGY | Facility: CLINIC | Age: 65
End: 2020-12-31
Payer: COMMERCIAL

## 2020-12-31 DIAGNOSIS — M54.16 RIGHT LUMBAR RADICULOPATHY: Primary | ICD-10-CM

## 2020-12-31 DIAGNOSIS — G12.21 ALS (AMYOTROPHIC LATERAL SCLEROSIS) (H): ICD-10-CM

## 2020-12-31 PROCEDURE — 99213 OFFICE O/P EST LOW 20 MIN: CPT | Mod: 95 | Performed by: PSYCHIATRY & NEUROLOGY

## 2020-12-31 RX ORDER — GABAPENTIN 300 MG/1
CAPSULE ORAL
Qty: 90 CAPSULE | Refills: 1 | Status: SHIPPED | OUTPATIENT
Start: 2020-12-31

## 2020-12-31 NOTE — LETTER
"12/31/2020        RE: Gera Garsia  [address]  [facility] [place] [facility] [address]     Dear Colleague,    Thank you for referring your patient, Gera Garsia, to the [facility] [place] [facility]. Please see a copy of my visit note below.    Gera Garsia is a 65 year old male who is being evaluated via a billable video visit.      The patient has been notified of following:     \"This video visit will be conducted via a call between you and your physician/provider. We have found that certain health care needs can be provided without the need for an in-person physical exam.  This service lets us provide the care you need with a video conversation.  If a prescription is necessary we can send it directly to your pharmacy.  If lab work is needed we can place an order for that and you can then stop by our lab to have the test done at a later time.    Video visits are billed at different rates depending on your insurance coverage.  Please reach out to your insurance provider with any questions.    If during the course of the call the physician/provider feels a video visit is not appropriate, you will not be charged for this service.\"    Patient has given verbal consent for Video visit?   How would you like to obtain your AVS?   If you are dropped from the video visit, the video invite should be resent to:   Will anyone else be joining your video visit?         Video-Visit Details    Type of service:  Video Visit    Video Start Time: 10.40 am  Video End Time: 10.57 am    Originating Location (pt. Location): Home    Distant Location (provider location):  [facility] NEUROLOG[facility]     Platform used for Video Visit: SISI Blevins            Service Date: 12/31/2020      VIDEO VISIT       HISTORY OF PRESENT ILLNESS:  I had the pleasure to evaluate Mr. Garsia via a billable video visit today.  An in-person visit was not recommended " due to COVID-19.  Total duration of the video call was 17 minutes; more than half was counseling.    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  He has slowly progressed since.  I last saw him in clinic on 10/01/2020.  He is not a .  Since then, he has noted progression of his weakness, mostly in the right hand, and he now has trouble opening jars or grabbing things.  He does not notice a lot of difficulty with fine hand movements or major change in his handwriting.  He has no weakness of his shoulder, so he can raise his arms overhead.  He has no trouble brushing teeth or combing hair, etc.  He denies any leg weakness.  He has no difficulties getting up from a chair, turning in bed, ascending stairs or walking.  No instability or falls.  However, he has noticed pain in the right leg.  The pain is located in the medial aspect of the thigh, and down to the medial calf.  It is a sharp pain.  It is worse when walking. It is relieved when lying in bed.  There is no numbness or tingling associated with this.  This pain has been present for about 1-2 weeks and is partially relieved by taking Aleve 2-3 times a day.  There is no similar pain or sensory symptoms in the left leg and no incontinence of bowel or bladder.  He denies dysphagia, dysarthria, sialorrhea, difficulty chewing, head drop, pseudobulbar affect.  He reports new shortness of breath with exertion lately.     He started riluzole 2 months ago at 50 mg b.i.d.  He has not had any liver function tests to date.  He takes Centrum, vitamin B complex, and vitamin D.  He was offered Radicava, but declined it because he finds infusions inconvenient.  He has not been evaluated by occupational therapy recently.      MEDICATIONS:  Reviewed and are as per Epic record.      In summary, Mr. Garsia has limb onset ALS.  He has had mild progression of his hand weakness, and now it is  affecting the right hand more.  We will ask OT to reassess him.  Otherwise, he does not have any new therapy needs.  There are no bulbar symptoms or leg weakness described.      He is complaining of new shortness of breath on exertion; therefore, I think that his spirometry should be repeated.  The last PFTs in our record were done in 10/2020 and were satisfactory.  We will order repeat ones.    Because he is on riluzole, he should have at least one time AST and ALT checked, and we will do this in the next month if possible.      Regarding the pain in the right leg, it sounds like lumbar radiculopathy to me.  I told him that if the symptoms persist for more than 1-2 additional weeks, he should probably get an MRI of the lumbar spine, and I ordered it.  I also offered him gabapentin to address the pain.  He agreed to try it.      He will return to our clinic for followup in 3 months or sooner if needed.         BRUCE SINGH MD             D: 2020   T: 2020   MT: [email or web address]      Name:     BART KHAN   MRN:      [id number]        Account:      [id number]   :      1955           Service Date: 2020      Document: [id number]

## 2020-12-31 NOTE — PROGRESS NOTES
"Gera Garsia is a 65 year old male who is being evaluated via a billable video visit.      The patient has been notified of following:     \"This video visit will be conducted via a call between you and your physician/provider. We have found that certain health care needs can be provided without the need for an in-person physical exam.  This service lets us provide the care you need with a video conversation.  If a prescription is necessary we can send it directly to your pharmacy.  If lab work is needed we can place an order for that and you can then stop by our lab to have the test done at a later time.    Video visits are billed at different rates depending on your insurance coverage.  Please reach out to your insurance provider with any questions.    If during the course of the call the physician/provider feels a video visit is not appropriate, you will not be charged for this service.\"    Patient has given verbal consent for Video visit?   How would you like to obtain your AVS?   If you are dropped from the video visit, the video invite should be resent to:   Will anyone else be joining your video visit?         Video-Visit Details    Type of service:  Video Visit    Video Start Time: 10.40 am  Video End Time: 10.57 am    Originating Location (pt. Location): Home    Distant Location (provider location):  [facility] NEUROLOG[facility]     Platform used for Video Visit: Jett Bolden, EMT          "

## 2020-12-31 NOTE — TELEPHONE ENCOUNTER
Call sent directly to me; patient looking for covid test result.   Was tested 12/29/20, I see is negative.    He had virtual  visit that day, I don't see quarantine was advised?    Patient says he feels fine now, no symptoms (had fever and cold symptoms per his report).    I advised his covid test was negative and that he monitor for recurrence of any symptoms and quarantine and get tested again if recurs.   Advised he should expect a call or Gongpingjiahart message from covid team with results and any other advice.    After we hung up, I see 12/30 mychart result note was sent to patient.  I am unable to read the note.    Michell Vela RN  [facility]

## 2020-12-31 NOTE — PROGRESS NOTES
Service Date: 12/31/2020      VIDEO VISIT       HISTORY OF PRESENT ILLNESS:  I had the pleasure to evaluate Mr. Garsia via a billable video visit today.  An in-person visit was not recommended due to COVID-19.  Total duration of the video call was 17 minutes; more than half was counseling.    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  He has slowly progressed since.  I last saw him in clinic on 10/01/2020.  He is not a .  Since then, he has noted progression of his weakness, mostly in the right hand, and he now has trouble opening jars or grabbing things.  He does not notice a lot of difficulty with fine hand movements or major change in his handwriting.  He has no weakness of his shoulder, so he can raise his arms overhead.  He has no trouble brushing teeth or combing hair, etc.  He denies any leg weakness.  He has no difficulties getting up from a chair, turning in bed, ascending stairs or walking.  No instability or falls.  However, he has noticed pain in the right leg.  The pain is located in the medial aspect of the thigh, and down to the medial calf.  It is a sharp pain.  It is worse when walking. It is relieved when lying in bed.  There is no numbness or tingling associated with this.  This pain has been present for about 1-2 weeks and is partially relieved by taking Aleve 2-3 times a day.  There is no similar pain or sensory symptoms in the left leg and no incontinence of bowel or bladder.  He denies dysphagia, dysarthria, sialorrhea, difficulty chewing, head drop, pseudobulbar affect.  He reports new shortness of breath with exertion lately.     He started riluzole 2 months ago at 50 mg b.i.d.  He has not had any liver function tests to date.  He takes Centrum, vitamin B complex, and vitamin D.  He was offered Radicava, but declined it because he finds infusions inconvenient.  He has not been evaluated by  occupational therapy recently.      MEDICATIONS:  Reviewed and are as per Epic record.      In summary, Mr. Garsia has limb onset ALS.  He has had mild progression of his hand weakness, and now it is affecting the right hand more.  We will ask OT to reassess him.  Otherwise, he does not have any new therapy needs.  There are no bulbar symptoms or leg weakness described.      He is complaining of new shortness of breath on exertion; therefore, I think that his spirometry should be repeated.  The last PFTs in our record were done in 10/2020 and were satisfactory.  We will order repeat ones.    Because he is on riluzole, he should have at least one time AST and ALT checked, and we will do this in the next month if possible.      Regarding the pain in the right leg, it sounds like lumbar radiculopathy to me.  I told him that if the symptoms persist for more than 1-2 additional weeks, he should probably get an MRI of the lumbar spine, and I ordered it.  I also offered him gabapentin to address the pain.  He agreed to try it.      He will return to our clinic for followup in 3 months or sooner if needed.         BRUCE SINGH MD             D: 2020   T: 2020   MT: coty      Name:     BART GARSIA   MRN:      [id number]        Account:      [id number]   :      1955           Service Date: 2020      Document: [id number]

## 2021-01-01 ENCOUNTER — OFFICE VISIT (OUTPATIENT)
Dept: NEUROLOGY | Facility: CLINIC | Age: 66
End: 2021-01-01
Payer: COMMERCIAL

## 2021-01-01 ENCOUNTER — RESEARCH ENCOUNTER (OUTPATIENT)
Dept: NEUROLOGY | Facility: CLINIC | Age: 66
End: 2021-01-01

## 2021-01-01 ENCOUNTER — RESULTS ONLY (OUTPATIENT)
Dept: LAB | Age: 66
End: 2021-01-01

## 2021-01-01 ENCOUNTER — DOCUMENTATION ONLY (OUTPATIENT)
Dept: RESPIRATORY THERAPY | Facility: CLINIC | Age: 66
End: 2021-01-01

## 2021-01-01 ENCOUNTER — TELEPHONE (OUTPATIENT)
Dept: NEUROLOGY | Facility: CLINIC | Age: 66
End: 2021-01-01

## 2021-01-01 ENCOUNTER — HOSPITAL ENCOUNTER (OUTPATIENT)
Dept: RESEARCH | Facility: CLINIC | Age: 66
End: 2021-04-23
Attending: PSYCHIATRY & NEUROLOGY
Payer: COMMERCIAL

## 2021-01-01 ENCOUNTER — TELEPHONE (OUTPATIENT)
Dept: FAMILY MEDICINE | Facility: CLINIC | Age: 66
End: 2021-01-01
Payer: COMMERCIAL

## 2021-01-01 ENCOUNTER — HOSPITAL ENCOUNTER (OUTPATIENT)
Dept: RESEARCH | Facility: CLINIC | Age: 66
End: 2021-05-28
Attending: PSYCHIATRY & NEUROLOGY
Payer: COMMERCIAL

## 2021-01-01 ENCOUNTER — OFFICE VISIT (OUTPATIENT)
Dept: FAMILY MEDICINE | Facility: CLINIC | Age: 66
End: 2021-01-01
Payer: COMMERCIAL

## 2021-01-01 ENCOUNTER — RESEARCH ENCOUNTER (OUTPATIENT)
Facility: CLINIC | Age: 66
End: 2021-01-01

## 2021-01-01 ENCOUNTER — MEDICAL CORRESPONDENCE (OUTPATIENT)
Dept: HEALTH INFORMATION MANAGEMENT | Facility: CLINIC | Age: 66
End: 2021-01-01
Payer: COMMERCIAL

## 2021-01-01 ENCOUNTER — ANCILLARY PROCEDURE (OUTPATIENT)
Dept: ULTRASOUND IMAGING | Facility: CLINIC | Age: 66
End: 2021-01-01
Attending: PSYCHIATRY & NEUROLOGY
Payer: COMMERCIAL

## 2021-01-01 ENCOUNTER — TELEPHONE (OUTPATIENT)
Dept: NEUROLOGY | Facility: CLINIC | Age: 66
End: 2021-01-01
Payer: COMMERCIAL

## 2021-01-01 ENCOUNTER — THERAPY VISIT (OUTPATIENT)
Dept: OCCUPATIONAL THERAPY | Facility: CLINIC | Age: 66
End: 2021-01-01
Payer: COMMERCIAL

## 2021-01-01 ENCOUNTER — TELEPHONE (OUTPATIENT)
Dept: FAMILY MEDICINE | Facility: CLINIC | Age: 66
End: 2021-01-01

## 2021-01-01 ENCOUNTER — HOSPITAL ENCOUNTER (OUTPATIENT)
Dept: RESEARCH | Facility: CLINIC | Age: 66
End: 2021-05-14
Attending: PSYCHIATRY & NEUROLOGY
Payer: COMMERCIAL

## 2021-01-01 ENCOUNTER — ALLIED HEALTH/NURSE VISIT (OUTPATIENT)
Dept: NEUROLOGY | Facility: CLINIC | Age: 66
End: 2021-01-01

## 2021-01-01 ENCOUNTER — HOSPITAL ENCOUNTER (OUTPATIENT)
Dept: OCCUPATIONAL THERAPY | Facility: CLINIC | Age: 66
Setting detail: THERAPIES SERIES
End: 2021-08-20
Attending: PSYCHIATRY & NEUROLOGY
Payer: COMMERCIAL

## 2021-01-01 ENCOUNTER — ALLIED HEALTH/NURSE VISIT (OUTPATIENT)
Dept: NURSING | Facility: CLINIC | Age: 66
End: 2021-01-01
Payer: COMMERCIAL

## 2021-01-01 ENCOUNTER — HOSPITAL ENCOUNTER (OUTPATIENT)
Dept: RESEARCH | Facility: CLINIC | Age: 66
End: 2021-06-11
Attending: PSYCHIATRY & NEUROLOGY
Payer: COMMERCIAL

## 2021-01-01 ENCOUNTER — HOSPITAL ENCOUNTER (OUTPATIENT)
Dept: RESEARCH | Facility: CLINIC | Age: 66
End: 2021-12-22
Attending: PEDIATRICS
Payer: COMMERCIAL

## 2021-01-01 ENCOUNTER — THERAPY VISIT (OUTPATIENT)
Dept: PHYSICAL THERAPY | Facility: CLINIC | Age: 66
End: 2021-01-01
Payer: COMMERCIAL

## 2021-01-01 ENCOUNTER — THERAPY VISIT (OUTPATIENT)
Dept: OCCUPATIONAL THERAPY | Facility: CLINIC | Age: 66
End: 2021-01-01
Attending: PSYCHIATRY & NEUROLOGY
Payer: COMMERCIAL

## 2021-01-01 ENCOUNTER — HOSPITAL ENCOUNTER (OUTPATIENT)
Dept: RESEARCH | Facility: CLINIC | Age: 66
End: 2021-09-03
Attending: PSYCHIATRY & NEUROLOGY
Payer: COMMERCIAL

## 2021-01-01 ENCOUNTER — DOCUMENTATION ONLY (OUTPATIENT)
Dept: RESPIRATORY THERAPY | Facility: CLINIC | Age: 66
End: 2021-01-01
Payer: COMMERCIAL

## 2021-01-01 ENCOUNTER — HEALTH MAINTENANCE LETTER (OUTPATIENT)
Age: 66
End: 2021-01-01

## 2021-01-01 ENCOUNTER — HOSPITAL ENCOUNTER (OUTPATIENT)
Dept: RESEARCH | Facility: CLINIC | Age: 66
End: 2021-07-08
Attending: PSYCHIATRY & NEUROLOGY
Payer: COMMERCIAL

## 2021-01-01 ENCOUNTER — DOCUMENTATION ONLY (OUTPATIENT)
Dept: OTHER | Facility: CLINIC | Age: 66
End: 2021-01-01
Payer: COMMERCIAL

## 2021-01-01 ENCOUNTER — PATIENT OUTREACH (OUTPATIENT)
Dept: CARE COORDINATION | Facility: CLINIC | Age: 66
End: 2021-01-01

## 2021-01-01 ENCOUNTER — ANCILLARY PROCEDURE (OUTPATIENT)
Dept: MRI IMAGING | Facility: CLINIC | Age: 66
End: 2021-01-01
Attending: PSYCHIATRY & NEUROLOGY
Payer: COMMERCIAL

## 2021-01-01 VITALS
SYSTOLIC BLOOD PRESSURE: 149 MMHG | HEART RATE: 73 BPM | RESPIRATION RATE: 16 BRPM | HEIGHT: 69 IN | TEMPERATURE: 97.5 F | DIASTOLIC BLOOD PRESSURE: 81 MMHG | WEIGHT: 151.83 LBS | BODY MASS INDEX: 22.49 KG/M2

## 2021-01-01 VITALS
TEMPERATURE: 97.6 F | BODY MASS INDEX: 22.45 KG/M2 | OXYGEN SATURATION: 98 % | WEIGHT: 152.6 LBS | HEART RATE: 100 BPM | DIASTOLIC BLOOD PRESSURE: 97 MMHG | SYSTOLIC BLOOD PRESSURE: 156 MMHG

## 2021-01-01 VITALS
WEIGHT: 147 LBS | OXYGEN SATURATION: 97 % | BODY MASS INDEX: 20.58 KG/M2 | SYSTOLIC BLOOD PRESSURE: 140 MMHG | DIASTOLIC BLOOD PRESSURE: 77 MMHG | RESPIRATION RATE: 15 BRPM | HEIGHT: 71 IN | HEART RATE: 99 BPM

## 2021-01-01 VITALS
HEART RATE: 63 BPM | WEIGHT: 132 LBS | OXYGEN SATURATION: 94 % | RESPIRATION RATE: 16 BRPM | DIASTOLIC BLOOD PRESSURE: 69 MMHG | SYSTOLIC BLOOD PRESSURE: 119 MMHG | BODY MASS INDEX: 19.94 KG/M2

## 2021-01-01 VITALS
WEIGHT: 149.03 LBS | DIASTOLIC BLOOD PRESSURE: 75 MMHG | SYSTOLIC BLOOD PRESSURE: 124 MMHG | TEMPERATURE: 98.1 F | BODY MASS INDEX: 21.97 KG/M2

## 2021-01-01 VITALS
SYSTOLIC BLOOD PRESSURE: 141 MMHG | OXYGEN SATURATION: 98 % | BODY MASS INDEX: 22.36 KG/M2 | HEART RATE: 71 BPM | WEIGHT: 152 LBS | DIASTOLIC BLOOD PRESSURE: 80 MMHG

## 2021-01-01 VITALS
BODY MASS INDEX: 21.75 KG/M2 | WEIGHT: 147.49 LBS | RESPIRATION RATE: 18 BRPM | TEMPERATURE: 98.1 F | HEART RATE: 71 BPM | DIASTOLIC BLOOD PRESSURE: 71 MMHG | SYSTOLIC BLOOD PRESSURE: 133 MMHG

## 2021-01-01 VITALS
HEART RATE: 107 BPM | DIASTOLIC BLOOD PRESSURE: 79 MMHG | TEMPERATURE: 99 F | SYSTOLIC BLOOD PRESSURE: 115 MMHG | RESPIRATION RATE: 18 BRPM

## 2021-01-01 VITALS
DIASTOLIC BLOOD PRESSURE: 72 MMHG | BODY MASS INDEX: 22.79 KG/M2 | WEIGHT: 154.6 LBS | TEMPERATURE: 97.1 F | OXYGEN SATURATION: 98 % | SYSTOLIC BLOOD PRESSURE: 138 MMHG | HEART RATE: 89 BPM

## 2021-01-01 VITALS
DIASTOLIC BLOOD PRESSURE: 78 MMHG | HEIGHT: 68 IN | SYSTOLIC BLOOD PRESSURE: 121 MMHG | BODY MASS INDEX: 20.92 KG/M2 | HEART RATE: 95 BPM | WEIGHT: 138 LBS | RESPIRATION RATE: 18 BRPM | TEMPERATURE: 97.2 F

## 2021-01-01 VITALS — BODY MASS INDEX: 21.88 KG/M2 | WEIGHT: 148.37 LBS

## 2021-01-01 DIAGNOSIS — G12.21 ALS (AMYOTROPHIC LATERAL SCLEROSIS) (H): Primary | ICD-10-CM

## 2021-01-01 DIAGNOSIS — G12.21 AMYOTROPHIC LATERAL SCLEROSIS (H): ICD-10-CM

## 2021-01-01 DIAGNOSIS — G12.21 ALS (AMYOTROPHIC LATERAL SCLEROSIS) (H): ICD-10-CM

## 2021-01-01 DIAGNOSIS — R29.898 WEAKNESS OF BOTH HANDS: Primary | ICD-10-CM

## 2021-01-01 DIAGNOSIS — Z74.09 IMPAIRED MOBILITY AND ADLS: ICD-10-CM

## 2021-01-01 DIAGNOSIS — Z78.9 IMPAIRED MOBILITY AND ADLS: ICD-10-CM

## 2021-01-01 DIAGNOSIS — Z13.220 SCREENING FOR HYPERLIPIDEMIA: ICD-10-CM

## 2021-01-01 DIAGNOSIS — R03.0 ELEVATED BLOOD PRESSURE READING: ICD-10-CM

## 2021-01-01 DIAGNOSIS — Z13.1 SCREENING FOR DIABETES MELLITUS: ICD-10-CM

## 2021-01-01 DIAGNOSIS — J96.01 ACUTE RESPIRATORY FAILURE WITH HYPOXIA (H): Primary | ICD-10-CM

## 2021-01-01 DIAGNOSIS — I82.401 ACUTE DEEP VEIN THROMBOSIS (DVT) OF RIGHT LOWER EXTREMITY, UNSPECIFIED VEIN (H): ICD-10-CM

## 2021-01-01 DIAGNOSIS — M17.10 UNILATERAL PRIMARY OSTEOARTHRITIS, UNSPECIFIED KNEE: ICD-10-CM

## 2021-01-01 DIAGNOSIS — I82.4Y1 ACUTE DEEP VEIN THROMBOSIS (DVT) OF PROXIMAL VEIN OF RIGHT LOWER EXTREMITY (H): ICD-10-CM

## 2021-01-01 DIAGNOSIS — I82.401 ACUTE DEEP VEIN THROMBOSIS (DVT) OF RIGHT LOWER EXTREMITY, UNSPECIFIED VEIN (H): Primary | ICD-10-CM

## 2021-01-01 DIAGNOSIS — N39.0 URINARY TRACT INFECTION WITH HEMATURIA, SITE UNSPECIFIED: Primary | ICD-10-CM

## 2021-01-01 DIAGNOSIS — R39.15 URINARY URGENCY: ICD-10-CM

## 2021-01-01 DIAGNOSIS — Z71.89 ADVANCED DIRECTIVES, COUNSELING/DISCUSSION: ICD-10-CM

## 2021-01-01 DIAGNOSIS — Z12.5 SCREENING FOR PROSTATE CANCER: ICD-10-CM

## 2021-01-01 DIAGNOSIS — I83.891 VARICOSE VEINS OF RIGHT LEG WITH EDEMA: ICD-10-CM

## 2021-01-01 DIAGNOSIS — R31.9 URINARY TRACT INFECTION WITH HEMATURIA, SITE UNSPECIFIED: Primary | ICD-10-CM

## 2021-01-01 DIAGNOSIS — J96.12 CHRONIC RESPIRATORY FAILURE WITH HYPERCAPNIA (H): ICD-10-CM

## 2021-01-01 DIAGNOSIS — R94.31 NONSPECIFIC ABNORMAL ELECTROCARDIOGRAM (ECG) (EKG): Primary | ICD-10-CM

## 2021-01-01 DIAGNOSIS — E78.5 HYPERLIPIDEMIA LDL GOAL <160: ICD-10-CM

## 2021-01-01 DIAGNOSIS — I83.891 VARICOSE VEINS OF RIGHT LEG WITH EDEMA: Primary | ICD-10-CM

## 2021-01-01 DIAGNOSIS — R29.898 LEFT HAND WEAKNESS: Primary | ICD-10-CM

## 2021-01-01 DIAGNOSIS — I72.2 ANEURYSM OF RENAL ARTERY (H): ICD-10-CM

## 2021-01-01 DIAGNOSIS — E78.5 HYPERLIPIDEMIA LDL GOAL <160: Primary | ICD-10-CM

## 2021-01-01 DIAGNOSIS — E78.49 OTHER HYPERLIPIDEMIA: ICD-10-CM

## 2021-01-01 DIAGNOSIS — Z78.9 IMPAIRED INSTRUMENTAL ACTIVITIES OF DAILY LIVING (IADL): ICD-10-CM

## 2021-01-01 DIAGNOSIS — E78.5 HYPERLIPIDEMIA, UNSPECIFIED: ICD-10-CM

## 2021-01-01 DIAGNOSIS — M54.16 RIGHT LUMBAR RADICULOPATHY: ICD-10-CM

## 2021-01-01 LAB
ALBUMIN UR-MCNC: NEGATIVE MG/DL
ALT SERPL W P-5'-P-CCNC: 41 U/L (ref 0–70)
ANION GAP SERPL CALCULATED.3IONS-SCNC: 4 MMOL/L (ref 3–14)
APPEARANCE UR: CLEAR
AST SERPL W P-5'-P-CCNC: 29 U/L (ref 0–45)
BILIRUB UR QL STRIP: NEGATIVE
BUN SERPL-MCNC: 16 MG/DL (ref 7–30)
CALCIUM SERPL-MCNC: 10 MG/DL (ref 8.5–10.1)
CHLORIDE SERPL-SCNC: 107 MMOL/L (ref 94–109)
CHOLEST SERPL-MCNC: 224 MG/DL
CHOLEST SERPL-MCNC: 242 MG/DL
CO2 SERPL-SCNC: 27 MMOL/L (ref 20–32)
COLOR UR AUTO: ABNORMAL
CREAT SERPL-MCNC: 0.74 MG/DL (ref 0.66–1.25)
CREAT UR-MCNC: 86 MG/DL
EXPTIME-PRE: 10.94 SEC
EXPTIME-PRE: 2.46 SEC
EXPTIME-PRE: 9.3 SEC
FEF2575-%PRED-PRE: 101 %
FEF2575-%PRED-PRE: 92 %
FEF2575-%PRED-PRE: 98 %
FEF2575-PRE: 2.41 L/SEC
FEF2575-PRE: 2.61 L/SEC
FEF2575-PRE: 2.64 L/SEC
FEF2575-PRED: 2.6 L/SEC
FEF2575-PRED: 2.6 L/SEC
FEF2575-PRED: 2.65 L/SEC
FEFMAX-%PRED-PRE: 51 %
FEFMAX-%PRED-PRE: 66 %
FEFMAX-%PRED-PRE: 88 %
FEFMAX-PRE: 4.45 L/SEC
FEFMAX-PRE: 5.79 L/SEC
FEFMAX-PRE: 7.71 L/SEC
FEFMAX-PRED: 8.65 L/SEC
FEFMAX-PRED: 8.65 L/SEC
FEFMAX-PRED: 8.74 L/SEC
FEV1-%PRED-PRE: 50 %
FEV1-%PRED-PRE: 78 %
FEV1-%PRED-PRE: 85 %
FEV1-PRE: 1.7 L
FEV1-PRE: 2.6 L
FEV1-PRE: 2.89 L
FEV1FEV6-PRE: 78 %
FEV1FEV6-PRE: 81 %
FEV1FEV6-PRE: 95 %
FEV1FEV6-PRED: 78 %
FEV1FVC-PRE: 78 %
FEV1FVC-PRE: 81 %
FEV1FVC-PRE: 95 %
FEV1FVC-PRED: 76 %
FEV1FVC-PRED: 76 %
FEV1FVC-PRED: 77 %
FIFMAX-PRE: 3.51 L/SEC
FIFMAX-PRE: 3.6 L/SEC
FIFMAX-PRE: 6.58 L/SEC
FVC-%PRED-PRE: 40 %
FVC-%PRED-PRE: 73 %
FVC-%PRED-PRE: 83 %
FVC-PRE: 1.79 L
FVC-PRE: 3.2 L
FVC-PRE: 3.69 L
FVC-PRED: 4.37 L
FVC-PRED: 4.37 L
FVC-PRED: 4.41 L
GFR SERPL CREATININE-BSD FRML MDRD: >90 ML/MIN/{1.73_M2}
GLUCOSE SERPL-MCNC: 86 MG/DL (ref 70–99)
GLUCOSE UR STRIP-MCNC: NEGATIVE MG/DL
HDLC SERPL-MCNC: 49 MG/DL
HDLC SERPL-MCNC: 55 MG/DL
HGB UR QL STRIP: NEGATIVE
KETONES UR STRIP-MCNC: NEGATIVE MG/DL
LDLC SERPL CALC-MCNC: 140 MG/DL
LDLC SERPL CALC-MCNC: 168 MG/DL
LEUKOCYTE ESTERASE UR QL STRIP: NEGATIVE
MEP-PRE: 111 CMH2O
MEP-PRE: 28 CMH2O
MEP-PRE: 61 CMH2O
MICROALBUMIN UR-MCNC: 22 MG/L
MICROALBUMIN/CREAT UR: 26.02 MG/G CR (ref 0–17)
MIP-PRE: -20 CMH2O
MIP-PRE: -31 CMH2O
MIP-PRE: -84 CMH2O
MUCOUS THREADS #/AREA URNS LPF: PRESENT /LPF
NITRATE UR QL: NEGATIVE
NONHDLC SERPL-MCNC: 175 MG/DL
NONHDLC SERPL-MCNC: 187 MG/DL
PH UR STRIP: 6 PH (ref 5–7)
POTASSIUM SERPL-SCNC: 4.5 MMOL/L (ref 3.4–5.3)
PSA SERPL-ACNC: 0.4 UG/L (ref 0–4)
RADIOLOGIST FLAGS: ABNORMAL
RBC #/AREA URNS AUTO: 1 /HPF (ref 0–2)
SODIUM SERPL-SCNC: 138 MMOL/L (ref 133–144)
SOURCE: ABNORMAL
SP GR UR STRIP: 1.02 (ref 1–1.03)
SQUAMOUS #/AREA URNS AUTO: 0 /HPF (ref 0–1)
TRIGL SERPL-MCNC: 175 MG/DL
TRIGL SERPL-MCNC: 94 MG/DL
UROBILINOGEN UR STRIP-MCNC: NORMAL MG/DL (ref 0–2)
WBC #/AREA URNS AUTO: <1 /HPF (ref 0–5)

## 2021-01-01 PROCEDURE — 94375 RESPIRATORY FLOW VOLUME LOOP: CPT | Performed by: INTERNAL MEDICINE

## 2021-01-01 PROCEDURE — 84450 TRANSFERASE (AST) (SGOT): CPT | Performed by: PATHOLOGY

## 2021-01-01 PROCEDURE — 99215 OFFICE O/P EST HI 40 MIN: CPT | Performed by: PSYCHIATRY & NEUROLOGY

## 2021-01-01 PROCEDURE — 300N000006 HC RESEARCH SPECIMEN PACKAGING

## 2021-01-01 PROCEDURE — 300N000010 HC RESEARCH B&I SPECIMEN PACKAGING, COMPLEX

## 2021-01-01 PROCEDURE — 97535 SELF CARE MNGMENT TRAINING: CPT | Mod: GO | Performed by: OCCUPATIONAL THERAPIST

## 2021-01-01 PROCEDURE — 97110 THERAPEUTIC EXERCISES: CPT | Mod: GO | Performed by: OCCUPATIONAL THERAPIST

## 2021-01-01 PROCEDURE — 510N000009 HC RESEARCH FACILITY, PER 15 MIN

## 2021-01-01 PROCEDURE — 36415 COLL VENOUS BLD VENIPUNCTURE: CPT | Performed by: FAMILY MEDICINE

## 2021-01-01 PROCEDURE — 80048 BASIC METABOLIC PNL TOTAL CA: CPT | Performed by: FAMILY MEDICINE

## 2021-01-01 PROCEDURE — 510N000017 HC CRU PATIENT CARE, PER 15 MIN

## 2021-01-01 PROCEDURE — 94799 UNLISTED PULMONARY SVC/PX: CPT | Performed by: INTERNAL MEDICINE

## 2021-01-01 PROCEDURE — 300N000005 HC RESEARCH SPECIMEN PROCESSING, COMPLEX

## 2021-01-01 PROCEDURE — 300N000007 HC RESEARCH B&I SPECIMEN PROCESSING, SIMPLE

## 2021-01-01 PROCEDURE — G0180 MD CERTIFICATION HHA PATIENT: HCPCS | Performed by: FAMILY MEDICINE

## 2021-01-01 PROCEDURE — 97760 ORTHOTIC MGMT&TRAING 1ST ENC: CPT | Mod: GO | Performed by: OCCUPATIONAL THERAPIST

## 2021-01-01 PROCEDURE — 36000 PLACE NEEDLE IN VEIN: CPT

## 2021-01-01 PROCEDURE — 99207 PR NO CHARGE NURSE ONLY: CPT

## 2021-01-01 PROCEDURE — 510N000023 HC CRU B&I PATIENT CARE, PER 15 MIN

## 2021-01-01 PROCEDURE — 72158 MRI LUMBAR SPINE W/O & W/DYE: CPT | Performed by: RADIOLOGY

## 2021-01-01 PROCEDURE — 300N000003 HC RESEARCH SPECIMEN PROCESSING, SIMPLE

## 2021-01-01 PROCEDURE — 99207 PR SATISFY VISIT NUMBER: CPT | Performed by: PSYCHIATRY & NEUROLOGY

## 2021-01-01 PROCEDURE — 99214 OFFICE O/P EST MOD 30 MIN: CPT | Mod: GC | Performed by: PSYCHIATRY & NEUROLOGY

## 2021-01-01 PROCEDURE — 99214 OFFICE O/P EST MOD 30 MIN: CPT | Performed by: FAMILY MEDICINE

## 2021-01-01 PROCEDURE — 97535 SELF CARE MNGMENT TRAINING: CPT | Mod: GP | Performed by: PHYSICAL THERAPIST

## 2021-01-01 PROCEDURE — 93971 EXTREMITY STUDY: CPT | Mod: RT | Performed by: RADIOLOGY

## 2021-01-01 PROCEDURE — 97166 OT EVAL MOD COMPLEX 45 MIN: CPT | Mod: GO | Performed by: OCCUPATIONAL THERAPIST

## 2021-01-01 PROCEDURE — 80061 LIPID PANEL: CPT | Performed by: FAMILY MEDICINE

## 2021-01-01 PROCEDURE — 36415 COLL VENOUS BLD VENIPUNCTURE: CPT | Performed by: PATHOLOGY

## 2021-01-01 PROCEDURE — A9585 GADOBUTROL INJECTION: HCPCS | Performed by: RADIOLOGY

## 2021-01-01 PROCEDURE — 84460 ALANINE AMINO (ALT) (SGPT): CPT | Performed by: PATHOLOGY

## 2021-01-01 PROCEDURE — 97161 PT EVAL LOW COMPLEX 20 MIN: CPT | Mod: GP | Performed by: PHYSICAL THERAPIST

## 2021-01-01 PROCEDURE — G0103 PSA SCREENING: HCPCS | Performed by: FAMILY MEDICINE

## 2021-01-01 RX ORDER — RILUZOLE 50 MG/1
50 TABLET, FILM COATED ORAL EVERY 12 HOURS
Qty: 60 TABLET | Refills: 3 | Status: SHIPPED | OUTPATIENT
Start: 2021-01-01 | End: 2021-01-01

## 2021-01-01 RX ORDER — RILUZOLE 50 MG/1
50 TABLET, FILM COATED ORAL EVERY 12 HOURS
Qty: 60 TABLET | Refills: 2 | Status: SHIPPED | OUTPATIENT
Start: 2021-01-01 | End: 2021-01-01

## 2021-01-01 RX ORDER — RILUZOLE 50 MG/1
50 TABLET, FILM COATED ORAL EVERY 12 HOURS
Qty: 60 TABLET | Refills: 2
Start: 2021-01-01

## 2021-01-01 RX ORDER — GADOBUTROL 604.72 MG/ML
7.5 INJECTION INTRAVENOUS ONCE
Status: COMPLETED | OUTPATIENT
Start: 2021-01-01 | End: 2021-01-01

## 2021-01-01 RX ORDER — SIMVASTATIN 40 MG
40 TABLET ORAL AT BEDTIME
Qty: 90 TABLET | Refills: 3 | Status: SHIPPED | OUTPATIENT
Start: 2021-01-01

## 2021-01-01 RX ORDER — RILUZOLE 50 MG/1
50 TABLET, FILM COATED ORAL EVERY 12 HOURS
Qty: 60 TABLET | Refills: 0 | Status: SHIPPED | OUTPATIENT
Start: 2021-01-01 | End: 2021-01-01

## 2021-01-01 RX ADMIN — GADOBUTROL 7 ML: 604.72 INJECTION INTRAVENOUS at 08:15

## 2021-01-01 SDOH — ECONOMIC STABILITY: TRANSPORTATION INSECURITY
IN THE PAST 12 MONTHS, HAS LACK OF TRANSPORTATION KEPT YOU FROM MEETINGS, WORK, OR FROM GETTING THINGS NEEDED FOR DAILY LIVING?: NOT ASKED

## 2021-01-01 SDOH — ECONOMIC STABILITY: TRANSPORTATION INSECURITY
IN THE PAST 12 MONTHS, HAS THE LACK OF TRANSPORTATION KEPT YOU FROM MEDICAL APPOINTMENTS OR FROM GETTING MEDICATIONS?: NOT ASKED

## 2021-01-01 SDOH — ECONOMIC STABILITY: FOOD INSECURITY: WITHIN THE PAST 12 MONTHS, YOU WORRIED THAT YOUR FOOD WOULD RUN OUT BEFORE YOU GOT MONEY TO BUY MORE.: NOT ASKED

## 2021-01-01 SDOH — ECONOMIC STABILITY: FOOD INSECURITY: WITHIN THE PAST 12 MONTHS, THE FOOD YOU BOUGHT JUST DIDN'T LAST AND YOU DIDN'T HAVE MONEY TO GET MORE.: NOT ASKED

## 2021-01-01 SDOH — ECONOMIC STABILITY: INCOME INSECURITY: HOW HARD IS IT FOR YOU TO PAY FOR THE VERY BASICS LIKE FOOD, HOUSING, MEDICAL CARE, AND HEATING?: NOT ASKED

## 2021-01-01 ASSESSMENT — ENCOUNTER SYMPTOMS
JOINT SWELLING: 0
DIZZINESS: 0
SHORTNESS OF BREATH: 0
MYALGIAS: 0
DIZZINESS: 0
CHILLS: 0
HEADACHES: 0
COUGH: 0
PALPITATIONS: 0
SORE THROAT: 0
CHILLS: 0
FEVER: 0
ARTHRALGIAS: 0
FEVER: 0
PARESTHESIAS: 0
HEADACHES: 0
NERVOUS/ANXIOUS: 0
ARTHRALGIAS: 0
JOINT SWELLING: 0
SHORTNESS OF BREATH: 0
WEAKNESS: 0
WEAKNESS: 0
PALPITATIONS: 0
MYALGIAS: 0
PARESTHESIAS: 0
SORE THROAT: 0
COUGH: 0
NERVOUS/ANXIOUS: 0

## 2021-01-01 ASSESSMENT — REVISED AMYOTROPHIC LATERAL SCLEROSIS FUNCTIONAL RATING SCALE (ALSFRS-R)
SPEECH: 4
ALSFRS_TOTAL_SCORE: 42
WALKING: NORMAL
DRESSING_AND_HYGEINE: 2
HANDWRITING: NORMAL
TURNING_IN_BED_AND_ADJUSTING_BED_CLOTHES: NORMAL
SWALLOWING: NORMAL EATING HABITS
ORTHOPENA: 4
CUTTING_FOOD_AND_HANDLING_UTENSILES: 3
HANDWRITING: 4
DYSPNEA: 4
RESPIRATORY_SUBTOTAL_SCORE: 12
SALIVATION: 4
SALIVATION: NORMAL
GROSS_MOTOR_SUBTOTAL_SCORE: 9
CLIMBING_STAIRS: NEEDS ASSISTANCE
BULBAR_SUBTOTAL: 12
CLIMBING_STAIRS: 1
RESPIRATORY_INSUFFICIENCY: 4
WALKING: 4
SWALLOWING: 4
DRESSING_AND_HYGEINE: INTERMITTENT ASSISTANCE OR SUBSTITUTE METHODS
FINE_MOTOR_SUBTOTAL_SCORE: 9
SPEECH: NORMAL SPEECH PROCESSES
TURNING_IN_BED_AND_ADJUSTING_BED_CLOTHES: 4
SIX_ITEM_SUBTOTAL: 22

## 2021-01-01 ASSESSMENT — MIFFLIN-ST. JEOR
SCORE: 1379.08
SCORE: 1455.98
SCORE: 1454.95

## 2021-01-01 ASSESSMENT — PAIN SCALES - GENERAL
PAINLEVEL: NO PAIN (0)

## 2021-01-06 NOTE — PATIENT INSTRUCTIONS
Someone will call you to schedule the MRI, the breathing test (PFT) and a lab appointment.    Let us know if you don't hear from someone by mid-January.    Please call [facility] @ [phone] for questions or concerns during regular business hours. For a more efficient way to communicate, use [employer] and address the message to your physician. Remember, vushaperhart is only read during business hours. Do not leave urgent messages on voicemail or [employer]. If situation is urgent, contact the Neurology Clinic @ [phone] and ask to speak to a Triage Nurse or Call 911 or visit an Emergency Department.    Please call your pharmacy if you need a medication refill. They will send us an electronic message.

## 2021-02-03 NOTE — DISCHARGE INSTRUCTIONS
MRI Contrast Discharge Instructions    The IV contrast you received today will pass out of your body in your  urine. This will happen in the next 24 hours. You will not feel this process.  Your urine will not change color.    Drink at least 4 extra glasses of water or juice today (unless your doctor  has restricted your fluids). This reduces the stress on your kidneys.  You may take your regular medicines.    If you are on dialysis: It is best to have dialysis today.    If you have a reaction: Most reactions happen right away. If you have  any new symptoms after leaving the hospital (such as hives or swelling),  call your hospital at the correct number below. Or call your family doctor.  If you have breathing distress or wheezing, call 911.    Special instructions: ***    I have read and understand the above information.    Signature:______________________________________ Date:___________    Staff:__________________________________________ Date:___________     Time:__________    [place] [facility] Departments:    ___[place]: [phone]  ___[facility]: [phone]  ___[place]: [phone] ___[facility]: [phone]  ___[facility]: [phone]  ___[facility]: [phone]  ___[phone]  ___[facility]: [phone]  ___[phone]

## 2021-03-25 NOTE — PROGRESS NOTES
"    OUTPATIENT OCCUPATIONAL THERAPY CLINIC NOTE  Gera Garsia  YOB: 1955  [id number]    Type of visit:  Evaluation            Date of service: 3/25/21; (last OT eval was 10/1/2020)    Referring provider: Dr. Kamari Wolf     present: No  Language: english    Others present at visit:   none    Medical diagnosis:   Amyotrophic lateral sclerosis (ALS)     Date of diagnosis: 10/01/20     Pertinent medical history:  Onset of fasiculations in L upper limb 1 year ago and then spread to R and then developed distal Upper limb weakness and trouble with buttoning. Now troubles with lifting.     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    Cardio-respiratory status:  Forced vital capacity: 83 % of predicted     Height/Weight: 5' 9\" / 152 lb    Living environment:  [place], [place]    Living environment barriers:  3 stairs to enter (no railings present)  Full stairs within home has installed railed to basement since his fall and has rail to upstairs    3 story               BED AND BATH ON MAIN, TUB/SHOWER , NO BARS, tub is to the right of toilet   Toilets regular height,. At girlfriends now has higher height toilet    Current assistance/living environment:  Lives alone, S.O., Michell, with pt a lot when not working from her home      Current mobility equipment:  None    Current ADL equipment:  Button hook, elastic laces, nail clipper board   L custom thumb splint   Bottle and can tab openers    Technology used: computer, tends to use minimally and \"hunts and pecks with index fingers\" at baseline    Patient concerns/goals: harder to don socks -has to pulls with bent fingers now, harder to open cans and bottles-ordered some AE to help with this    Evaluation   Interview completed.   Pain assessment:  Pain " denied    Range of motion:  UE: WFL with exception of end range limitations now at shoulders with some kyphotic posture ; L thumb opposition limited due to intrinsic weakness with thumb webspace atrophy; L wrist extensor AROM impaired    Manual muscle testing: UE: See neurology note. Shoulders at least 3/5; Manual  force weak on L as compared to R. Lateral pinch is weak on L as compared to R and palmar pinch very difficult to achieve on L and R is weak today with give way of R thumb    Cognition:  Somewhat tangential but otherwise WFL    ADL:   Feeding self:  Cutting food hard due to holding fork on L hand is difficult  Grooming: Ind  UE Dressing:  Ind Buttons hard with weak L hand, has button hook now  LE Dressing:  Ind has to use plier to zip zipper on jeans, socks harder to pull on due to hand weakness, tying laces is hard, slip on shoes today  Showering/bathing: Ind-stands  Toileting/transfer:  Ind-more difficult, wants to get suction cup bar for tub to aid this    IADL:   Home management:  Does all, vacuum system for leaves, lifting system can be harder, feels a little weaker  Driving:  Automatic, drives fine per report  Occupation: retired, 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 No      Has the patient fallen and had an injury in the past year? Yes, fall down the stairs, R RAMIRO gave out       Timed Up and Go Score: defer to PT, amb in [place] with MD today observed with no LOB  Notes R LE fasciculations and knee arthritis now that limits stairs  Is the patient a fall risk? Yes     Impairments:  Fatigue  Muscle atrophy  Coordination  Range of motion     Treatment diagnosis:  Impaired activities of daily living  Impaired IADL    Assessment of Occupational Performance: 3-5 performance deficits  Identified Performance Deficits (ie: feeding, social skills): dressing, eating, home management, toilet  transfers  Clinical Decision Making (Complexity): Mod complexity     Recommendations/Plan of care:  Patient would benefit from interventions to enhance safety and independence.  Rehab potential good for stated goals.  Occupational therapy intervention for  self care/home management and therapeutic procedure  1 session evaluation & treatment.    Goals:   Target date: today  Patient, family and/or caregiver will verbalize understanding of evaluation results and implications for UE HEP for stretching to aid functional performance.  Patient, family and/or caregiver will verbalize/demonstrate understanding of compensatory methods /equipment to enhance functional independence and safety.  Patient, family and/or caregiver will verbalize/demonstrate understanding of positioning techniques/equipment to aid hand function      Educational assessment/barriers to learning:  No barriers noted      Treatment provided this date:   Self care/home management, 22 minutes of training in:  -AE to compensate for reduced hand function on bilaterally:  Purpose of custom R thumb splint-hand therapy referral  Purpose of custom L thumb splint with wrist support -hand therapy referral  Rocker knife and Zipper ring pulls-requested from [employer] loan program and demonstrated rocker knife online for pt  Suction cup grab bar-requested from [employer] program for pt as he wants to suction to tub edge for aiding getting up from toilet. Declined clamp on tub bar and toilet safety frame as recommended by therapist as safest options for toileting transfer assist.    Therapeutic exercise: 8 min of training in:  Purpose and method of completing Stretches for end range shoulder flexion, ext rot, and hosriz abd and also for thoracic ext stretch. Demonstrated for pt and issued handout for use at home with reps and frequency recorded for pt.        Response to treatment/recommendations: very receptive.    Goal attainment:  All goals met    Risks and benefits of  evaluation/treatment have been explained.  Patient, family and/or caregiver are in agreement with Plan of Care.     Timed Code Treatment Minutes: 30  Total Treatment Time (sum of timed and untimed services): 40    Signature: IRENE Hollins, [facility]   Date: 3/25/21

## 2021-03-25 NOTE — PROGRESS NOTES
"    OUTPATIENT PHYSICAL THERAPY CLINIC NOTE  Gera Garsia  YOB: 1955  [id number]    Type of visit:         Evaluation     Date of service: 3/25/2021    Referring provider: Dr. Kamari Wolf MD     present: No    Others present at visit:  None    Medical diagnosis:   Amyotrophic lateral sclerosis (ALS)    Date of diagnosis: 10/01/2020    Pertinent history of current problem (include personal factors and/or comorbidities that impact the plan of care): Patient has limb onset ALS starting with progressive left arm and hand weakness beginning in October 2019.  Patient reports overall slow progress, but reports worsening weakness of right hand.  Per ultrasound after patient's clinic appointment: Right popliteal and femoral vein thrombus    Cardio-respiratory status:  Forced vital capacity: 83 % of predicted  (February 3, 2021)    Height/Weight: 5'9\" / 152lbs    Living environment:  House    Living environment barriers:  3 stairs to enter (0 railing present)  12-14 stairs within home (1 railing present)-flight of stairs to basement in flight of stairs to second level     Current assistance/living environment:  Lives alone.  Has significant other, Michell, who lives close by      Current mobility equipment:  None     Current ADL equipment:  See OT note    Technology used: Computer    Patient concerns/goals: Patient reports right lower extremity pain and swelling, no weakness.  Patient had a fall on stairs (basement): Recently installed a handrail-no further falls    Evaluation   Interview completed.   Pain assessment:  Pain present-right ankle     Range of motion: Mild restrictions in bilateral gastrocs and hamstrings     Manual muscle testing: Gross assessment 4 to 4+ out of 5   Gait: Patient ambulates without a device independent; reduced pushoff on right with mild antalgic gait.  Patient reports increased right medial ankle discomfort and weightbearing   Cognition: " Intact   Integumentary: Mild swelling in right anterior and medial ankle.  Tenderness with palpation posterior to medial malleolus and arch of foot    Fall Risk Screen:   Has the patient fallen 2 or more times in the last year? No      Has the patient fallen and had an injury in the past year? No       Timed Up and Go Score: NT    Is the patient a fall risk? No-patient did have 1 fall at home on stairs, was not using handrail     Impairments:  Range of motion  Integumentary     Treatment diagnosis:  Impaired mobility  Impaired activities of daily living    Clinical Presentation: Evolving/Changing  Clinical Presentation Rationale: Personal factors, body systems involved, progressive nature of ALS  Clinical Decision Making (Complexity): Low complexity     Recommendations/Plan of care:  1 session evaluation & treatment.     Goals:   Target date: 3/25/2021  Patient, family and/or caregiver will verbalize/demonstrate understanding of positioning techniques/equipment.    Educational assessment/barriers to learning:   No barriers noted     Treatment provided this date:   Self-care/ADL management-10 minutes  -Educated patient on edema management including elevation of lower extremity above level of heart to assist with venous return.  Educated patient on other edema techniques depending on ultrasound results-if negative; recommended trialing light weight compression stockings (20-3- mmHg) during the day, doffing at night  -Educated patient on possible tendinitis of right ankle inverters due to tenderness with palpation along tendons.  Recommended patient receive Ortho consult through Ortho walk-in if Doppler negative and continued swelling and discomfort with weightbearing present.  Patient reports previous history of tendinitis in same foot which resolved with stretching    Response to treatment/recommendations: Patient verbalizes understanding    Goal attainment:  All goals met     Risks and benefits of  evaluation/treatment have been explained.  Patient, family and/or caregiver are in agreement with Plan of Care.     Timed Code Treatment Minutes: 10  Total Treatment Time (sum of timed and untimed services): 20    Signature: Claire Bolden PT   Date: 3/25/2021    Certification:  Onset date: 3/25/2021  Start of care date: 3/25/2021  Certification date from 3/25/2021 to 3/25/2021    I CERTIFY THE NEED FOR THESE SERVICES FURNISHED UNDER        THIS PLAN OF TREATMENT AND WHILE UNDER MY CARE     (Physician attestation of this document indicates review and certification of the therapy plan).

## 2021-03-25 NOTE — LETTER
"3/25/2021       RE: Gera Garsia  [address] [facility] [address]     Dear Colleague,    Thank you for referring your patient, Gera Garsia, to the [facility] [place] at [facility]. Please see a copy of my visit note below.    Service Date: 03/25/2021      HISTORY OF PRESENT ILLNESS:  I had the pleasure to see Mr. Garsia at the [facility] today.  He is a 66-year-old man with limb onset ALS, presenting with progressive left arm and hand weakness that began approximately in October-November of 2019.  It has been progressing rather slowly. Since the last time we met in 10/2020, he noticed worsening weakness of his right hand- he he has trouble doing a firm .  He can still hold a pen and write legibly, brush his teeth and cut his food.  He is a little slower with food cutting than he used to be.  He has no trouble raising the right arm overhead.  The left arm weakness is progressing and the left hand appears \"nearly useless\". He is using a left cockup splint for wrist drop, but he thinks the splint needs to be a little longer to support his wrist better. He denies any leg weakness.  Specifically, he has no trouble turning in bed, getting up from a chair, ascending a flight of stairs or walking, no tripping or falls.  He does not use a cane.  He denies any cramps or in the upper and lower extremities. He has no dysarthria, dysphagia, sialorrhea, difficulty chewing, pseudobulbar affect, head drop or other bulbar symptoms.  He denies orthopnea.  He sleeps with only 1 pillow at night.  He only has an occasional headache upon awakening in the morning.  No significant dyspnea on exertion.     He has been on riluzole for several months, tolerating it well.  He is on 50 mg b.i.d.  He had AST and ALT checked on 02/03/2021 which were normal. He was reluctant to do Radicava " infusions because of inconvenience.  He is complaining of swelling at the right foot and ankle.  This is worse as the day goes by and he is up on his feet more.  It is not painful.  There is no redness or skin discharge there.  He does not report any recent fracture or prolonged immobilization.      MEDICATIONS:  Reviewed and are as per Epic record.     PFT Latest Ref Rng & Units 2/3/2021   FVC L 3.69   FEV1 L 2.89   FVC% % 83   FEV1% % 85     MIP -84 cm H2O     BP (!) 141/80   Pulse 71   Wt 68.9 kg (152 lb)   SpO2 98%   BMI 22.36 kg/m       NEUROLOGIC EXAMINATION:  He is awake, alert and oriented x 3.  Cranial nerves show no ptosis or ophthalmoparesis.  There is no weakness of orbicularis oculi, oris, uvula, jaw, palate or tongue.  Lateral tongue movements are done with decent speed, but there are subtle tongue fasciculations, especially posteriorly.  He has a trace jaw jerk.  There is no dysarthria or dysphonia.  His neck flexion and extension strength are full.  Strength is 4/5 for bilateral deltoids, biceps right 4, left 4-.  Triceps bilaterally 5.  Wrist extensors right 5, left less than antigravity.  Finger extensors 4/3.  FDI 3/0.  APB 2/0.  His handgrip is right full, left 3 to 4-.   Leg strength is 5/5 for hip flexion, knee extension, knee flexion, foot dorsiflexion and great toe extension.  There is no obvious weakness of the right foot.     Tone is probably a bit increased in the right arm, normal at the left arm and the legs. Reflexes are very brisk, 3+ at the knees and ankles with vertical spread, but toes are downgoing.  Agility of foot tapping is a bit reduced bilaterally.   Reflexes in the upper extremities were positive at the pectoralis.  Positive right Sarah, absent left Sarah.  Triceps 3+ bilaterally.  Brachioradialis, 3+ bilaterally.  Biceps right, 2+ left 3+. He has mild to moderate edema at the right ankle and lower third of the calf.  It is nonpitting.  There is no erythema, or  induration in the area.  Mika sign is negative.      IMPRESSION:  In summary, Mr. Garsia has slow progression of his limb onset ALS compared to the last visit that now affects more the left arm and to some degree the right hand and arm.  He will see our occupational and physical therapist again today.  Otherwise, I do not have any additional recommendations.        He will continue riluzole 50 mg b.i.d.  He had liver function tests checked a month ago that were normal.  I will not repeat them.  He also had pulmonary function tests in 02/2021, which were satisfactory (see above). They will not be repeated today as he does not have any obvious respiratory symptoms or indolent signs of respiratory failure.      Regarding the right lower extremity edema, I will get a Doppler ultrasound out of caution to rule out DVT, although clinically in my opinion, this does not appears very unlikely.  If the ultrasound is negative, then most likely his mild edema is explained by venous incompetence as he has quite prominent varicose veins down the foot.  It should be treated by his primary with elevation of the foot and compression stockings as needed.        He will return to clinic for followup in 3 months or sooner if needed.      ADDENDUM: Ultrasound Doppler showed a thrombus at the right popliteal but also femoral vein. Unfortunately patient had left the Radiology Department when the Radiologist called me with the preliminary report. I called the cell phone number of his significant other (Michell) immediately, which was the first contact number listed in the chart. I explained the result to her and passed my recommendation that Gera goes to the closest ED or urgent care immediately (today) to start anticoagulation. She verbalized understanding and she confirmed that she will pass the message to Gera. KALPESH    TT spent on this encounter 40 minutes, including 20 face to face with patient, 15  in post visit note dictation,  editing, orders, review of ultrasound, and emergent communication of result to significant other (acute DVT), and 5 in pre-visit chart review.      BRUCE SINGH MD       D: 2021   T: 2021   MT: JULIO CÉSAR      Name:     BART KHAN   MRN:      [id number]        Account:      [id number]   :      1955           Service Date: 2021      Document: [id number]

## 2021-03-25 NOTE — DISCHARGE INSTRUCTIONS
Compression stockings off [employer]: 20-30 mmHg. Wear during the day, take off at night  Stretch your calf frequently- you can stand on the step, but hold onto the rail  If swelling continues: elevate your leg on a chair or lay down with it above your head, use an ice pack    Possibly a tendonitis on your right foot- specifically you ankle invertors  - Rest frequently. Sit down and rest. Listen to your body  - If ultrasound is negative and the pain/swelling continues, then make an appointment with the ortho clinic walk-in     Claire Bolden PT, DPT  Physical Therapist  [facility] [address]  [address] [place] [address]  [email or web address]  Appointments: [phone]

## 2021-03-25 NOTE — PATIENT INSTRUCTIONS
Will get a doppler ultrasound for your right leg to rule out a blood clot. If it's negative then the swelling should be managed by your PCP. The most likely cause is poor venous circulation. Treatments include elevation of the leg when sleeping and compression stockings.  Follow up 3 months  PT and OT will see you today

## 2021-03-25 NOTE — PROGRESS NOTES
"Service Date: 03/25/2021      HISTORY OF PRESENT ILLNESS:  I had the pleasure to see Mr. Garsia at the [facility] today.  He is a 66-year-old man with limb onset ALS, presenting with progressive left arm and hand weakness that began approximately in October-November of 2019.  It has been progressing rather slowly. Since the last time we met in 10/2020, he noticed worsening weakness of his right hand- he he has trouble doing a firm .  He can still hold a pen and write legibly, brush his teeth and cut his food.  He is a little slower with food cutting than he used to be.  He has no trouble raising the right arm overhead.  The left arm weakness is progressing and the left hand appears \"nearly useless\". He is using a left cockup splint for wrist drop, but he thinks the splint needs to be a little longer to support his wrist better. He denies any leg weakness.  Specifically, he has no trouble turning in bed, getting up from a chair, ascending a flight of stairs or walking, no tripping or falls.  He does not use a cane.  He denies any cramps or in the upper and lower extremities. He has no dysarthria, dysphagia, sialorrhea, difficulty chewing, pseudobulbar affect, head drop or other bulbar symptoms.  He denies orthopnea.  He sleeps with only 1 pillow at night.  He only has an occasional headache upon awakening in the morning.  No significant dyspnea on exertion.     He has been on riluzole for several months, tolerating it well.  He is on 50 mg b.i.d.  He had AST and ALT checked on 02/03/2021 which were normal. He was reluctant to do Radicava infusions because of inconvenience.  He is complaining of swelling at the right foot and ankle.  This is worse as the day goes by and he is up on his feet more.  It is not painful.  There is no redness or skin discharge there.  He does not report any recent fracture or prolonged immobilization.    "   MEDICATIONS:  Reviewed and are as per Epic record.     PFT Latest Ref Rng & Units 2/3/2021   FVC L 3.69   FEV1 L 2.89   FVC% % 83   FEV1% % 85     MIP -84 cm H2O     BP (!) 141/80   Pulse 71   Wt 68.9 kg (152 lb)   SpO2 98%   BMI 22.36 kg/m       NEUROLOGIC EXAMINATION:  He is awake, alert and oriented x 3.  Cranial nerves show no ptosis or ophthalmoparesis.  There is no weakness of orbicularis oculi, oris, uvula, jaw, palate or tongue.  Lateral tongue movements are done with decent speed, but there are subtle tongue fasciculations, especially posteriorly.  He has a trace jaw jerk.  There is no dysarthria or dysphonia.  His neck flexion and extension strength are full.  Strength is 4/5 for bilateral deltoids, biceps right 4, left 4-.  Triceps bilaterally 5.  Wrist extensors right 5, left less than antigravity.  Finger extensors 4/3.  FDI 3/0.  APB 2/0.  His handgrip is right full, left 3 to 4-.   Leg strength is 5/5 for hip flexion, knee extension, knee flexion, foot dorsiflexion and great toe extension.  There is no obvious weakness of the right foot.     Tone is probably a bit increased in the right arm, normal at the left arm and the legs. Reflexes are very brisk, 3+ at the knees and ankles with vertical spread, but toes are downgoing.  Agility of foot tapping is a bit reduced bilaterally.   Reflexes in the upper extremities were positive at the pectoralis.  Positive right Sarah, absent left Sarah.  Triceps 3+ bilaterally.  Brachioradialis, 3+ bilaterally.  Biceps right, 2+ left 3+. He has mild to moderate edema at the right ankle and lower third of the calf.  It is nonpitting.  There is no erythema, or induration in the area.  Mika sign is negative.      IMPRESSION:  In summary, Mr. Garsia has slow progression of his limb onset ALS compared to the last visit that now affects more the left arm and to some degree the right hand and arm.  He will see our occupational and physical therapist again today.   Otherwise, I do not have any additional recommendations.        He will continue riluzole 50 mg b.i.d.  He had liver function tests checked a month ago that were normal.  I will not repeat them.  He also had pulmonary function tests in 02/2021, which were satisfactory (see above). They will not be repeated today as he does not have any obvious respiratory symptoms or indolent signs of respiratory failure.      Regarding the right lower extremity edema, I will get a Doppler ultrasound out of caution to rule out DVT, although clinically in my opinion, this does not appears very unlikely.  If the ultrasound is negative, then most likely his mild edema is explained by venous incompetence as he has quite prominent varicose veins down the foot.  It should be treated by his primary with elevation of the foot and compression stockings as needed.        He will return to clinic for followup in 3 months or sooner if needed.      ADDENDUM: Ultrasound Doppler showed a thrombus at the right popliteal but also femoral vein. Unfortunately patient had left the Radiology Department when the Radiologist called me with the preliminary report. I called the cell phone number of his significant other (Michell) immediately, which was the first contact number listed in the chart. I explained the result to her and passed my recommendation that Gera goes to the closest ED or urgent care immediately (today) to start anticoagulation. She verbalized understanding and she confirmed that she will pass the message to Gera.     TT spent on this encounter 40 minutes, including 20 face to face with patient, 15  in post visit note dictation, editing, orders, review of ultrasound, and emergent communication of result to significant other (acute DVT), and 5 in pre-visit chart review.        BRUCE SINGH MD             D: 03/25/2021   T: 03/25/2021   MT: JULIO CÉSAR      Name:     GERA KHAN   MRN:      [id number]        Account:       [id number]   :      1955           Service Date: 2021      Document: [id number]

## 2021-03-25 NOTE — PROGRESS NOTES
US technologist made RN aware that pt was found to have a positive DVT to his right lower extremity. I was going to assess and notify pt, but when I got to his room pt was not there. Pt was unable to be located in the [facility]. I called his cell phone and left a VM. I called his home phone and was able to speak to his significant other and let her know to please have the patient call me back.     US technologist also called his ordering provider and is having his ordering provider reach out to the patient directly as well and notify him of these findings.        Update: The provider got in touch with the patient and he is headed to the ER per the patient's significant other.        Rhonda Haas, Imaging RN

## 2021-04-07 PROBLEM — G12.21 ALS (AMYOTROPHIC LATERAL SCLEROSIS) (H): Status: ACTIVE | Noted: 2021-01-01

## 2021-04-07 PROBLEM — R29.898 WEAKNESS OF BOTH HANDS: Status: RESOLVED | Noted: 2021-01-01 | Resolved: 2021-01-01

## 2021-04-07 PROBLEM — G12.21 ALS (AMYOTROPHIC LATERAL SCLEROSIS) (H): Status: RESOLVED | Noted: 2021-01-01 | Resolved: 2021-01-01

## 2021-04-07 PROBLEM — R29.898 WEAKNESS OF BOTH HANDS: Status: ACTIVE | Noted: 2021-01-01

## 2021-04-07 NOTE — PROGRESS NOTES
Hand Therapy Initial Evaluation    Current Date:  4/7/2021    Subjective:  Gera Garsia is a 66 year old right hand dominant male.    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 Since onset symptoms are gradually getting worse.  Special tests:  none.  Previous treatment: Seen by hand therapy in October 2020 for left HB thumb spica, now increased weakness in left and also right hand.  General health as reported by patient is good.  Pertinent medical history includes:None  Medical allergies:none.  Surgical history: none.  Medication history: see list in EMR.    Occupational Profile Information:  Current occupation is Retired   Home Tasks: Driving, Lifting, Carrying, Pushing, Pulling  Prior functional level:  independent-all household chores  Barriers include:none  Mobility: No difficulty  Transportation: drives    Pain Level (Scale 0-10)   4/7/2021   At Rest 0   With Use 0     Edema  None    Sensation  WNL throughout all nerve distributions; per patient report    ROM  Significant thenar and thumb adductor atrophy, unable to oppose thumb to fingers left hand due to weakness, wrist is also weaker in past few months per patient report. Mild right thumb atrophy.    Strength   (Measured in pounds)  Pain Report: - none  + mild    ++ moderate    +++ severe    4/7/2021 4/7/2021   Trials Right Left   1 47 10     Lat Pinch 4/7/2021 4/7/2021   Trials Right Left   1 9 3     3 Pt Pinch 4/7/2021 4/7/2021   Trials Right Left   1 5 unable     Assessment/Plan:  Patient's limitations or Problem List includes:  Decreased ROM/motion and Weakness of the bilateral hands which interferes with the patient's ability to perform Self Care Tasks (dressing, eating, bathing), Recreational Activities, Household Chores and Driving  as compared to previous level of  function.    Rehab Potential:  Good - Return to full activity, some limitations    Patient will benefit from skilled Occupational Therapy to increase stability of wrist, stability of thumb, dexterity and sensation to return to previous activity level and resume normal daily tasks and to reach their rehab potential.    Barriers to Learning:  No barrier    Communication Issues:  Patient appears to be able to clearly communicate and understand verbal and written communication and follow directions correctly.    Chart Review: Chart Review and Simple history review with patient    Identified Performance Deficits: bathing/showering, dressing, hygiene and grooming, home establishment and management, meal preparation and cleanup, work and leisure activities    Assessment of Occupational Performance:  5 or more Performance Deficits    Clinical Decision Making (Complexity): Low complexity    Treatment Explanation:  The following has been discussed with the patient:  RX ordered/plan of care  Anticipated outcomes  Possible risks and side effects    Treatment Plan:    Frequency:  1 x visit  Duration:  NA; 1 x visit    Orthotic Fabrication:  Hand and Forearm based orthoses    Discharge Plan:  Achieve all LTG.  Independent in home treatment program.  Reach maximal therapeutic benefit.    Home Exercise Program:  Wear left SARAVANAN thumb spica and right HBTS orthoses to improve thumb position with ADL's as indicated

## 2021-04-14 NOTE — PROGRESS NOTES
1. Acute deep vein thrombosis (DVT) of right lower extremity, unspecified vein (H)  Will need to be eliquis for at least 3 months.  The last 2 months were sent to his mail order pharmacy in [place].   - apixaban ANTICOAGULANT (ELIQUIS) 5 MG tablet; Take 1 tablet (5 mg) by mouth 2 times daily  Dispense: 60 tablet; Refill: 0    2. Hyperlipidemia  Currently not on medications.    3. Advanced directives, counseling/discussion  Advanced directive given to patient.     4. Elevated blood pressure reading  Will need close follow-up next visit.       Octaiva Boss is a 66 year old who presents for the following health issues     HPI       Hospital Follow-up Visit:    Hospital/Nursing Home/IP Rehab Facility: [facility]  Date of Admission: 03/25/2021  Date of Discharge: 03/26/2021  Reason(s) for Admission: DVT      Was your hospitalization related to COVID-19? No   Problems taking medications regularly:  None  Medication changes since discharge: None  Problems adhering to non-medication therapy:  None    Summary of hospitalization:  CareEverywhere information obtained and reviewed  Diagnostic Tests/Treatments reviewed.  Follow up needed: none  Other Healthcare Providers Involved in Patient s Care:         None  Update since discharge: stable.    Post Discharge Medication Reconciliation: discharge medications reconciled, continue medications without change.  Plan of care communicated with patient                1. Hospital follow-up: Patient was diagnosed with right lower DVT.  Patient was admitted to [facility].  Patient had CT scan which was consistent with a PE.  Patient was started on eliquis.  History of ALS. Denies any history smoking, cancer, and sedentary lifestyle.  Since discharge, his states that his right lower leg swelling has improved.     Review of Systems   Constitutional: Negative for chills and fever.   HENT: Negative for congestion, ear pain, hearing loss and sore throat.    Respiratory: Negative for  cough and shortness of breath.    Cardiovascular: Negative for chest pain, palpitations and peripheral edema.   Musculoskeletal: Negative for arthralgias, joint swelling and myalgias.   Skin: Negative for rash.   Neurological: Negative for dizziness, weakness, headaches and paresthesias.   Psychiatric/Behavioral: Negative for mood changes. The patient is not nervous/anxious.             Objective    BP (!) 156/97 (BP Location: Left arm, Cuff Size: Adult Regular)   Pulse 100   Temp 97.6  F (36.4  C) (Tympanic)   Wt 69.2 kg (152 lb 9.6 oz)   SpO2 98%   BMI 22.45 kg/m    Body mass index is 22.45 kg/m .  Physical Exam  Constitutional:       General: He is not in acute distress.     Appearance: He is well-developed.   HENT:      Head: Normocephalic and atraumatic.      Nose: Nose normal.   Eyes:      Conjunctiva/sclera: Conjunctivae normal.   Neck:      Musculoskeletal: Normal range of motion.      Trachea: No tracheal deviation.   Cardiovascular:      Rate and Rhythm: Normal rate and regular rhythm.      Heart sounds: Normal heart sounds.   Pulmonary:      Effort: Pulmonary effort is normal.      Breath sounds: No wheezing.   Musculoskeletal: Normal range of motion.   Skin:     Findings: No erythema or rash.   Neurological:      Mental Status: He is alert and oriented to person, place, and time.   Psychiatric:         Behavior: Behavior normal.

## 2021-04-15 NOTE — PATIENT INSTRUCTIONS
Lakeshia Boss,    Thank you for allowing [facility] to manage your care.    I sent your prescriptions to your pharmacy.    If you have any questions or concerns, please feel free to call us at [phone].    Sincerely,    Dr. Jones    Did you know?      You can schedule a video visit for follow-up appointments as well as future appointments for certain conditions.  Please see the below link.     [email or web address]    If you have not already done so,  I encourage you to sign up for [facility] ([email or web address]).  This will allow you to review your results, securely communicate with a provider, and schedule virtual visits as well.

## 2021-04-23 NOTE — ADDENDUM NOTE
Encounter addended by: Qing Solano on: 4/23/2021 2:30 PM   Actions taken: Charge Capture section accepted

## 2021-04-23 NOTE — ADDENDUM NOTE
Encounter addended by: Alaina García on: 4/23/2021 5:32 PM   Actions taken: Charge Capture section accepted

## 2021-04-26 NOTE — TELEPHONE ENCOUNTER
[facility] Call Center    Phone Message    May a detailed message be left on voicemail: yes     Reason for Call: Other: Patient wife is calling to see if patient can get a refill for the next two weeks just until presciption comes in the mail. Stated that prescription takes two to six weeks. Please call to discuss.     Action Taken: Other[place] [facility]     Travel Screening: Not Applicable

## 2021-04-27 NOTE — TELEPHONE ENCOUNTER
Duplicate, 30 day supply sent to [facility] on 4/15/21. Message sent to requesting pharmacy.

## 2021-04-27 NOTE — PROGRESS NOTES
Assessment & Plan     Acute deep vein thrombosis (DVT) of right lower extremity, unspecified vein (H)  Will need to complete until 21.  First episode.  - apixaban ANTICOAGULANT (ELIQUIS) 5 MG tablet; Take 1 tablet (5 mg) by mouth 2 times daily    Screening for hyperlipidemia  Currently on fish oil 1200 mg daily.   - Lipid panel reflex to direct LDL Fasting; Future    Screening for diabetes mellitus  - Basic metabolic panel; Future    Screening for prostate cancer  - Prostate spec antigen screen; Future    Urinary urgency  Would like to rule out prostate cancer, diabetes, BPH.  Scheduled for upcoming physical where we will perform a prosttate exam.      See Patient Instructions    Return in about 3 months (around 2021) for with me, Physical Exam.    [facility]    Past Medical History:   Diagnosis Date     ALS (amyotrophic lateral sclerosis) (H)      Arthritis      History of deep venous thrombosis      Macular pigment deposit, os      Open angle with borderline findings, low risk      Post-splenectomy     s/p trauma     Problems with hearing     bilat hearing aids       Past Surgical History:   Procedure Laterality Date     ABDOMEN SURGERY      x 2, once as a , and once for spenectomy      COLONOSCOPY WITH CO2 INSUFFLATION N/A 2019    Procedure: COLONOSCOPY, WITH CO2 INSUFFLATION;  Surgeon: Ambrosio Bonilla MD;  Location: [facility] OR     ENT SURGERY       ORTHOPEDIC SURGERY      AC joint separation repair- complicated with infection       Family History   Problem Relation Age of Onset     Coronary Artery Disease Father 67         from [place], smoker, drinker, obesity     Hyperlipidemia Father      Coronary Artery Disease Paternal Uncle      Hypertension Mother      Hyperlipidemia Mother      Breast Cancer No family hx of      Cancer - colorectal No family hx of      Ovarian Cancer No family hx of      Prostate Cancer No family hx of        Social  History     Tobacco Use     Smoking status: Former Smoker     Packs/day: 1.00     Years: 13.00     Pack years: 13.00     Quit date: 1984     Years since quittin.3     Smokeless tobacco: Never Used   Substance Use Topics     Alcohol use: Not Currently     Comment: Formerly beer 5-6 beers/day, quit 10 yrs ago ()     Current Outpatient Medications   Medication     apixaban ANTICOAGULANT (ELIQUIS) 5 MG tablet     fish oil-omega-3 fatty acids 1000 MG capsule     riluzole (RILUTEK) 50 MG tablet     gabapentin (NEURONTIN) 300 MG capsule     Current Facility-Administered Medications   Medication     ropivacaine (NAROPIN) injection 3 mL     ropivacaine (NAROPIN) injection 3 mL     triamcinolone (KENALOG-40) injection 40 mg     triamcinolone (KENALOG-40) injection 40 mg     Facility-Administered Medications Ordered in Other Visits   Medication     sodium chloride (PF) 0.9% PF flush 3 mL      No Known Allergies    1. Establish care    2. History of DVT: Right lower extremities. Currently on eliquis.  This was originally diagnosed in 3/25/21. He states that his swelling is markedly improved.     3. History of ALS: Currently on riluzole.  Patient is currently being followed by neurology.     4. Urinary urgency: Ongoing for the past 5 months.  He states that he wakes up 3-4 times at night.  He does admit to drinking 2-3 cups of coffee per day.    5. Hyperlipidemia: Currently on fish oil 1200 mg daily.     Octavia Boss is a 66 year old who presents for the following health issues     HPI     Review of Systems   Constitutional: Negative for chills and fever.   HENT: Negative for congestion, ear pain, hearing loss and sore throat.    Respiratory: Negative for cough and shortness of breath.    Cardiovascular: Negative for chest pain, palpitations and peripheral edema.   Genitourinary: Positive for urgency.   Musculoskeletal: Negative for arthralgias, joint swelling and myalgias.   Skin: Negative for rash.    Neurological: Negative for dizziness, weakness, headaches and paresthesias.   Psychiatric/Behavioral: Negative for mood changes. The patient is not nervous/anxious.          Objective    BP (!) 153/85 (BP Location: Left arm, Cuff Size: Adult Regular)   Pulse 89   Temp 97.1  F (36.2  C) (Tympanic)   Wt 70.1 kg (154 lb 9.6 oz)   SpO2 98%   BMI 22.79 kg/m    Body mass index is 22.79 kg/m .  Physical Exam  Constitutional:       General: He is not in acute distress.     Appearance: He is well-developed.   HENT:      Head: Normocephalic and atraumatic.      Nose: Nose normal.   Eyes:      Conjunctiva/sclera: Conjunctivae normal.   Neck:      Musculoskeletal: Normal range of motion.      Trachea: No tracheal deviation.   Cardiovascular:      Rate and Rhythm: Normal rate and regular rhythm.      Heart sounds: Normal heart sounds.   Pulmonary:      Effort: Pulmonary effort is normal.      Breath sounds: No wheezing.   Musculoskeletal: Normal range of motion.   Skin:     Findings: No erythema or rash.   Neurological:      Mental Status: He is alert and oriented to person, place, and time.   Psychiatric:         Behavior: Behavior normal.

## 2021-04-29 PROBLEM — H43.12 VITREOUS HEMORRHAGE OF LEFT EYE (H): Status: RESOLVED | Noted: 2017-12-22 | Resolved: 2021-01-01

## 2021-04-29 NOTE — PROGRESS NOTES
[facility] Master Consent    [facility] ALS Platform Trial     IRB: [id number]  PI: Anish Tan  pager: [phone]  :   Jeanna Bucio   [phone]  Shelli Daryl   [phone]     Participation in the [facility] trial has been discussed with patient prior to Screening Visit. The patient was also provided with the consent forms for the Master Protocol as well as for each treatment regimen to review. Once Patient confirmed interest in the study and willingness to participate, a screening visit was scheduled.    At beginning of screening visit, the current approved IRB consent form was discussed and explained to the patient by investigator. The consent form was reviewed including purpose, research hypothesis, nature of the research, risk & benefits. Also reviewed were confidentiality issues, compensation for injury, and alternative procedures available and it was discussed that involvement with the study is voluntary. The three drug regimen were also described. The patient was given time to review and ask any questions about the consent. Patient was shown contact information for PI and study staff in consent for future questions. Patient verbalized understanding of consent and study by restating the purpose, procedures, duration, risk, confidentiality of PHI, and voluntarily participation. Questions and concerns were addressed. Patient printed, signed and dated the Master Protocol Consent and the HIPAA form prior to study involvement. A copy was given to the patient for their records.    Subject Consent/HIPAA: SIGNED ON 23 April 2021.    Anish Tan M.D.

## 2021-04-29 NOTE — PATIENT INSTRUCTIONS
Lakeshia Boss,    Thank you for allowing [facility] to manage your care.    I ordered some fasting blood work, please call [phone] to schedule your laboratory or office appointment.     I sent your prescriptions to your pharmacy.    For your convenience, test results are released as soon as they are available  Please allow 1-2 business days for me to send you a comment about your results.  If not done so, I encourage you to login into [email or web address] to review your results in real time.     If you have any questions or concerns, please feel free to call us at [phone].    Sincerely,    Dr. Jones    Did you know?      You can schedule a video visit for follow-up appointments as well as future appointments for certain conditions.  Please see the below link.     [email or web address]    If you have not already done so,  I encourage you to sign up for [email or web address] ([email or web address]).  This will allow you to review your results, securely communicate with a provider, and schedule virtual visits as well.    We are working hard to begin vaccinating more people against COVID-19. Currently, we are vaccinating:    Individuals age 65 and older    Phase 1a healthcare workers, both paid and unpaid, who are unable to do their job remotely.     People 16 and older with rare conditions or disabilities that put them at higher risk listed in Phase 1b tier 2.    People age 45-64 years with one or more underlying medical conditions listed in Phase 1b tier 3.    People age 16 or 18-44 years with two or more underlying medical conditions listed in Phase 1b tier 3.    People age 50 years and older in multigenerational housing (three or more generations living in the household)     If you fit into one of these categories, please log in to [facility] using this link to see if we have an open appointment and schedule an appointment.  Most new  appointments are released on Tuesdays at 8 a.m. This is when appointment availability is best. Additional appointments may open up during the week as appointments are canceled or we receive additional vaccine.    If there are no appointments left, you will be unable to schedule.   If you have technical difficulty using [employer], call [phone] for assistance.      You can learn more about the state's phased approach to administering the vaccine, with details on each phase,?[email or web address].     As vaccine supply increases and we are able to open appointments to more groups, we will share that information on our website:  [email or web address]. Check this website to stay up to date on COVID-19 vaccination information.

## 2021-05-14 NOTE — ADDENDUM NOTE
Encounter addended by: Alaina García on: 5/14/2021 2:08 PM   Actions taken: Charge Capture section accepted

## 2021-05-14 NOTE — ADDENDUM NOTE
Encounter addended by: Qing Solano on: 5/14/2021 1:12 PM   Actions taken: Charge Capture section accepted

## 2021-06-07 NOTE — TELEPHONE ENCOUNTER
[facility] Call Center    Phone Message    May a detailed message be left on voicemail: yes     Reason for Call: Medication Refill Request    Has the patient contacted the pharmacy for the refill? Yes   Name of medication being requested: riluzole (RILUTEK) 50 MG tablet  Provider who prescribed the medication: Dr. Villagran  Pharmacy: [id number] - [place], [address]  Date medication is needed: ASAP         Action Taken: Message routed to:  [facility] Neurology    Travel Screening: Not Applicable

## 2021-06-14 NOTE — PROGRESS NOTES
[facility] Master Consent    [facility] ALS Platform Trial     IRB: [id number]  PI: Anish Tan  pager: [phone]  :   Katey Bucio   [phone]  Shelli Brito   [phone]     Participation in the [facility] trial has been discussed with patient prior to Screening Visit. The patient was also provided with the consent forms for the Master Protocol as well as for each treatment regimen to review. Once Patient confirmed interest in the study and willingness to participate, a screening visit was scheduled.    At beginning of screening visit, the current approved IRB consent form was discussed and explained to the patient by investigator. The consent form was reviewed including purpose, research hypothesis, nature of the research, risk & benefits. Also reviewed were confidentiality issues, compensation for injury, and alternative procedures available and it was discussed that involvement with the study is voluntary. The three drug regimen were also described. The patient was given time to review and ask any questions about the consent. Patient was shown contact information for PI and study staff in consent for future questions. Patient verbalized understanding of consent and study by restating the purpose, procedures, duration, risk, confidentiality of PHI, and voluntarily participation. Questions and concerns were addressed. Patient signed and dated the Master Protocol Consent and the HIPAA form prior to study involvement. A copy was given to the patient for their records.    Subject Consent/HIPAA: SIGNED ON 04/23/2021  HPI      ROS      Physical Exam

## 2021-06-14 NOTE — PROGRESS NOTES
[facility] ALS Platform Trial Regimen B Verdiperstat   IRB: [id number]  PI: Anish Tan  pager: [phone]  :   Shelli Brito   [phone]  Cat Peñavirgilio   [phone]       Verdiperstat is a brain-permeable irreversible myeloperoxidase (MPO) enzyme that is administered orally.    Participation in the Regimen B Verdiperstat of the [facility] Platform trial has been discussed with patient prior to Regimen Screening Visit. The patient was provided with the consent forms for the Regimen B to review. At beginning of visit, the current approved IRB consent form was discussed and explained to the patient by investigator. The consent form was reviewed including purpose, research hypothesis, nature of the research, risk & benefits. The patient was given time to review and ask any questions about the consent. Patient verbalized understanding of consent and study by restating the purpose, procedures, duration, risk, confidentiality of PHI, and voluntarily participation. Questions and concerns were addressed. Patient signed and dated the Regimen B form prior to study involvement. A copy was given to the patient for their records.    Subject Consent: SIGNED ON 05.14.2021

## 2021-06-17 PROBLEM — O22.30 DVT (DEEP VEIN THROMBOSIS) IN PREGNANCY: Status: ACTIVE | Noted: 2021-01-01

## 2021-06-17 PROBLEM — I26.99 ACUTE PULMONARY EMBOLISM (H): Status: ACTIVE | Noted: 2021-01-01

## 2021-06-17 NOTE — PROGRESS NOTES
"[facility] [place]   Neurology Clinic Follow up Note  Gera Garsia  [id number]  06/17/2021    Brief summery:  He is a 66-year-old man with limb onset ALS, presented with progressive left arm and hand weakness that began approximately in October-November of 2019.  It has been progressing rather slowly. He has been on riluzole for several months, tolerating it well.  He is on 50 mg b.i.d.  He had AST and ALT checked on 02/03/2021 which were normal. He was reluctant to do Radicava infusions because of inconvenience.      Subjective:    He was enrolled recently in the ROSA trial for ALS (platform). He thinks he is subjectively better with more flexing motion of his left hand and fingers. He thinks also cutting food is better fatuma he used to. Of note, 3 months ago I noted right lower extremity edema on his exam and same day ultrasound with Doppler showed a right femoral and popliteal vein DVT. He was sent to ER and treated with parenteral anticoagulation followed by oral apixaban 5 mg bid. He has been on anticoagulation for 3 months now and plan is to receive another 3 per his PCP. Fortunately the edema has resolved and he did not develop PE.   (1) speech:m: no dysarthria  (2) salivation: no sialorrhea   (3) swallowing: no dysphagia,  (4) handwriting : no issues with hand writing.   (5) cutting food and handling utensils: He thinks cutting food. He still has trouble doing a firm .  He can still hold a pen and write legibly, brush his teeth and cut his food. He has no trouble raising the right arm overhead. The left arm weakness is progressing and the left hand appears \"nearly useless\". He is using a left cockup splint for wrist drop, but he thinks the splint needs to be a little longer to support his wrist better.   (6) dressing and hygiene: he can dress him self and bath him self and going to the toilet independently   (7) turning in bed and adjusting bed clothes: he can roll over " in bed with out any problem. He can adjust his clothes with no issues as well.   (8) walking: he has no trouble getting up from a chair, ascending a flight of stairs or walking, no tripping or falls.  He does not use a cane.   (9) climbing stairs : he has not trouble from going upstairs.   (10) breathing  : He denies orthopnea. No significant dyspnea on exertion.    No pseudobulbar affect. No head drop or other bulbar symptoms    Objective   There were no vitals taken for this visit. There were no vitals taken for this visit.    General: pt laying comfortably in bed, breathing easily on ra, in NAD   HEENT: no oral ulcers   Chest: cta   Heart: rrr  Abdomen: soft, nt, nd, +BS  Ext: no edema   Skin: no rashes  Neurological examination:  Mental status:  Patient is alert, attentive, and oriented x 3.  Language is coherent and fluent without dysarthria or aphasia.  Memory, comprehension and ability to follow commands were intact.        Cranial nerves: Cranial nerves show no ptosis or ophthalmoparesis.  There is no weakness of orbicularis oculi, oris, uvula, jaw, palate or tongue.  Lateral tongue movements are done with decent speed, but there are subtle tongue fasciculations, especially posteriorly.  He has a trace jaw jerk.  There is no dysarthria or dysphonia.    Motor exam: No atrophy or fasciculations.  No action or percussion myotonia or paramyotonia.  Manual muscle testing revealed the following MRC grade muscle power:    Right Left   Neck flexion 5     Neck extension 5          Shoulder abduction 4 4   Elbow extension 5 5   Elbow flexion             4 4-   Wrist extension 4 1        Finger extension 4 1   Finger flexion 5 5   FDI 3 0   APB 2 0   Hip extension 5 5    Hip flexion 5 5    Knee extension 5 5   Knee flexion 5 5   Dorsiflexion 4+ 4   Plantarflexion 5 5                      His handgrip is right full, left 3     Tone increased of the right  arm     Complex motor skills revealed normal coordination.   Finger-nose- finger and heel to shin were intact.       Sensory exam  PP intact  Gait was normal.  Pt was able to walk  Toes not toes      Deep tendon reflexes:    Right Left   Triceps 3+ 3+   Biceps 3+ 3+   Brachioradialis 3+ 3+   Knee jerk 3+ 3+   Ankle jerk 3+ 3+   Positive right Sarah, absent left Sarah.          Investigations    No results found for: PH, PHARTERIAL, PO2, [id number], SAT, PCO2, HCO3, BASEEXCESS, DEBRA, BEB       Lab Results   Component Value Date     05/06/2021    Lab Results   Component Value Date    CHLORIDE 107 05/06/2021    Lab Results   Component Value Date    BUN 16 05/06/2021      Lab Results   Component Value Date    POTASSIUM 4.5 05/06/2021    Lab Results   Component Value Date    CO2 27 05/06/2021    Lab Results   Component Value Date    CR 0.74 05/06/2021        No results found for: NTBNPI, NTBNP  Lab Results   Component Value Date    WBC 7.6 06/29/2020    HGB 13.8 06/29/2020    HCT 40.5 06/29/2020     06/29/2020     06/29/2020     Lab Results   Component Value Date    CR 0.74 05/06/2021     No results found for: DD, DIMER  Lab Results   Component Value Date     05/06/2021    POTASSIUM 4.5 05/06/2021    CHLORIDE 107 05/06/2021    CO2 27 05/06/2021    GLC 86 05/06/2021     Lab Results   Component Value Date    SED 14 03/17/2016     Lab Results   Component Value Date    GFRESTIMATED >90 05/06/2021    GFRESTBLACK >90 05/06/2021     Lab Results   Component Value Date    GLC 86 05/06/2021     Lab Results   Component Value Date    HGB 13.8 06/29/2020     Lab Results   Component Value Date    AST 29 02/03/2021    ALT 41 02/03/2021    ALKPHOS 76 10/19/2015    BILITOTAL 0.8 10/19/2015     No results found for: INR  No results found for: BILINEONATAL, TCBIL  Lab Results   Component Value Date     06/29/2020     Lab Results   Component Value Date    BUN 16 05/06/2021    CR 0.74 05/06/2021     Lab Results   Component Value Date    TSH 0.81 06/29/2020     No  results found for: TROPONIN, TROPI, TROPR, TROPN  Lab Results   Component Value Date    URINEKETONE Negative 04/23/2021     Lab Results   Component Value Date    WBC 7.6 06/29/2020       PFT Latest Ref Rng & Units 6/17/2021   FVC L 3.20   FEV1 L 2.60   FVC% % 73   FEV1% % 78     Impression:    Mr. Garsia has slow progression of his limb onset ALS compared to the last visit that now affects more the left arm and to some degree the right hand and arm.  He will see our occupational therapist again today to adjust the splint of the left hand, he may need longer splint. His PFT today showed a moderate decline from the last visit (in 2/2021 FVC was 83% predicted and MIP -84, now FVC is 73%, MIP -31), but since he remains asymptomatic from respiratory standpoint, his sleep is not disrupted, and his FVC is still >70% of normal, I will not offer NIV yet. Otherwise, I do not have any additional recommendations.     He will continue riluzole 50 mg b.i.d.  He had liver function tests were checked 3 months ago that were normal and he also gets surveillance labs regularly due to his enrollment in ROSA trial. Fortunately his right LE DVT was treated appropriately and appears to have resolved.     Recommendations:     1. occupational therapy evaluation  2. Continue riluzole+ enrollment in research study as above  3. Continue anticoagulation for right LE DVT per PCP recommendations  4. We will see Mr Garsia in follow up after 6 months     Patient was seen and discussed with attending neurologist, Dr. Maryann Granger MD  Neurology G4  [phone]    ATTENDING ADDENDUM: Patient seen and examined with resident Dr Granger on 6/17/2021. Agree with his impression and recommendations. Billing MDM level 4 (moderate) based on 1) One chronic disorder with progression, exacerbation, or side effects of treatment (ALS) and 2) Risk: Medication management (prescription of riluzole, monitoring for side effects with LFT testing, as above).  Kamari Wolf MD, FAAN    ADDENDUM (8/30/2021): Mr Ady communicated us by In[employer] message on 8/8/2021 that he is experiencing new dyspnea on exertion. His PFT done during his visit on 6/17/2021 showed FVC and FEV1 values of 73% and 78% predicted respectively, which were satisfactory, yet his MIP was low at -31 cm H2O. Low MIP indicates diaphragmatic weakness and this, along with his new symptoms of dyspnea on exertion, makes him eligible to receive noninvasive ventilation (NIV) with BiPAP or other device. Early initiation of NIV in ALS is critically important and can prolong survival and meaningfully improve quality of life, therefore I recommend it based on the recent mychart encounter. In addition, the patient reports difficulty clearing secretions and in my opinion, a cough assist device is also medically necessary and should be started at this point. Kamari Wolf MD, FAAN

## 2021-06-17 NOTE — LETTER
"6/17/2021       RE: Gera Garsia  [address]  [facility] [address]     Dear Colleague,    Thank you for referring your patient, Gera Garsia, to the [facility] NEUROLOGY CLINIC [place] [facility]. Please see a copy of my visit note below.    [facility]   Neurology Clinic Follow up Note  Gera Garsia  [id number]  06/17/2021    Brief summery:  He is a 66-year-old man with limb onset ALS, presented with progressive left arm and hand weakness that began approximately in October-November of 2019.  It has been progressing rather slowly. He has been on riluzole for several months, tolerating it well.  He is on 50 mg b.i.d.  He had AST and ALT checked on 02/03/2021 which were normal. He was reluctant to do Radicava infusions because of inconvenience.      Subjective:    He was enrolled recently in the ROSA trial for ALS (platform). He thinks he is subjectively better with more flexing motion of his left hand and fingers. He thinks also cutting food is better fatuma he used to. Of note, 3 months ago I noted right lower extremity edema on his exam and same day ultrasound with Doppler showed a right femoral and popliteal vein DVT. He was sent to ER and treated with parenteral anticoagulation followed by oral apixaban 5 mg bid. He has been on anticoagulation for 3 months now and plan is to receive another 3 per his PCP. Fortunately the edema has resolved and he did not develop PE.   (1) speech:m: no dysarthria  (2) salivation: no sialorrhea   (3) swallowing: no dysphagia,  (4) handwriting : no issues with hand writing.   (5) cutting food and handling utensils: He thinks cutting food. He still has trouble doing a firm .  He can still hold a pen and write legibly, brush his teeth and cut his food. He has no trouble raising the right arm overhead. The left arm weakness is progressing and the left hand appears \"nearly " "useless\". He is using a left cockup splint for wrist drop, but he thinks the splint needs to be a little longer to support his wrist better.   (6) dressing and hygiene: he can dress him self and bath him self and going to the toilet independently   (7) turning in bed and adjusting bed clothes: he can roll over in bed with out any problem. He can adjust his clothes with no issues as well.   (8) walking: he has no trouble getting up from a chair, ascending a flight of stairs or walking, no tripping or falls.  He does not use a cane.   (9) climbing stairs : he has not trouble from going upstairs.   (10) breathing  : He denies orthopnea. No significant dyspnea on exertion.    No pseudobulbar affect. No head drop or other bulbar symptoms    Objective   There were no vitals taken for this visit. There were no vitals taken for this visit.    General: pt laying comfortably in bed, breathing easily on ra, in NAD   HEENT: no oral ulcers   Chest: cta   Heart: rrr  Abdomen: soft, nt, nd, +BS  Ext: no edema   Skin: no rashes  Neurological examination:  Mental status:  Patient is alert, attentive, and oriented x 3.  Language is coherent and fluent without dysarthria or aphasia.  Memory, comprehension and ability to follow commands were intact.        Cranial nerves: Cranial nerves show no ptosis or ophthalmoparesis.  There is no weakness of orbicularis oculi, oris, uvula, jaw, palate or tongue.  Lateral tongue movements are done with decent speed, but there are subtle tongue fasciculations, especially posteriorly.  He has a trace jaw jerk.  There is no dysarthria or dysphonia.    Motor exam: No atrophy or fasciculations.  No action or percussion myotonia or paramyotonia.  Manual muscle testing revealed the following MRC grade muscle power:    Right Left   Neck flexion 5     Neck extension 5          Shoulder abduction 4 4   Elbow extension 5 5   Elbow flexion             4 4-   Wrist extension 4 1        Finger extension 4 1 "   Finger flexion 5 5   FDI 3 0   APB 2 0   Hip extension 5 5    Hip flexion 5 5    Knee extension 5 5   Knee flexion 5 5   Dorsiflexion 4+ 4   Plantarflexion 5 5                      His handgrip is right full, left 3     Tone increased of the right  arm     Complex motor skills revealed normal coordination.  Finger-nose- finger and heel to shin were intact.       Sensory exam  PP intact  Gait was normal.  Pt was able to walk  Toes not toes      Deep tendon reflexes:    Right Left   Triceps 3+ 3+   Biceps 3+ 3+   Brachioradialis 3+ 3+   Knee jerk 3+ 3+   Ankle jerk 3+ 3+   Positive right Sarah, absent left Sarah.          Investigations    No results found for: PH, PHARTERIAL, PO2, [id number], SAT, PCO2, HCO3, BASEEXCESS, DEBRA, BEB       Lab Results   Component Value Date     05/06/2021    Lab Results   Component Value Date    CHLORIDE 107 05/06/2021    Lab Results   Component Value Date    BUN 16 05/06/2021      Lab Results   Component Value Date    POTASSIUM 4.5 05/06/2021    Lab Results   Component Value Date    CO2 27 05/06/2021    Lab Results   Component Value Date    CR 0.74 05/06/2021        No results found for: NTBNPI, NTBNP  Lab Results   Component Value Date    WBC 7.6 06/29/2020    HGB 13.8 06/29/2020    HCT 40.5 06/29/2020     06/29/2020     06/29/2020     Lab Results   Component Value Date    CR 0.74 05/06/2021     No results found for: DD, DIMER  Lab Results   Component Value Date     05/06/2021    POTASSIUM 4.5 05/06/2021    CHLORIDE 107 05/06/2021    CO2 27 05/06/2021    GLC 86 05/06/2021     Lab Results   Component Value Date    SED 14 03/17/2016     Lab Results   Component Value Date    GFRESTIMATED >90 05/06/2021    GFRESTBLACK >90 05/06/2021     Lab Results   Component Value Date    GLC 86 05/06/2021     Lab Results   Component Value Date    HGB 13.8 06/29/2020     Lab Results   Component Value Date    AST 29 02/03/2021    ALT 41 02/03/2021    ALKPHOS 76  10/19/2015    BILITOTAL 0.8 10/19/2015     No results found for: INR  No results found for: BILINEONATAL, TCBIL  Lab Results   Component Value Date     06/29/2020     Lab Results   Component Value Date    BUN 16 05/06/2021    CR 0.74 05/06/2021     Lab Results   Component Value Date    TSH 0.81 06/29/2020     No results found for: TROPONIN, TROPI, TROPR, TROPN  Lab Results   Component Value Date    URINEKETONE Negative 04/23/2021     Lab Results   Component Value Date    WBC 7.6 06/29/2020       PFT Latest Ref Rng & Units 6/17/2021   FVC L 3.20   FEV1 L 2.60   FVC% % 73   FEV1% % 78     Impression:    Mr. Garsia has slow progression of his limb onset ALS compared to the last visit that now affects more the left arm and to some degree the right hand and arm.  He will see our occupational therapist again today to adjust the splint of the left hand, he may need longer splint. His PFT today showed a moderate decline from the last visit (in 2/2021 FVC was 83% predicted and MIP -84, now FVC is 73%, MIP -31), but since he remains asymptomatic from respiratory standpoint, his sleep is not disrupted, and his FVC is still >70% of normal, I will not offer NIV yet. Otherwise, I do not have any additional recommendations.     He will continue riluzole 50 mg b.i.d.  He had liver function tests were checked 3 months ago that were normal and he also gets surveillance labs regularly due to his enrollment in ROSA trial. Fortunately his right LE DVT was treated appropriately and appears to have resolved.     Recommendations:     1. occupational therapy evaluation  2. Continue riluzole+ enrollment in research study as above  3. Continue anticoagulation for right LE DVT per PCP recommendations  4. We will see Mr Garsia in follow up after 6 months     Patient was seen and discussed with attending neurologist, Dr. Maryann Granger MD  Neurology G4  [phone]    ATTENDING ADDENDUM: Patient seen and examined with resident  Dr Granger on 6/17/2021. Agree with his impression and recommendations. Billing MDM level 4 (moderate) based on 1) One chronic disorder with progression, exacerbation, or side effects of treatment (ALS) and 2) Risk: Medication management (prescription of riluzole, monitoring for side effects with LFT testing, as above). Kamari Wolf MD, FAAN    ADDENDUM (8/30/2021): Mr Ady communicated us by [employer] message on 8/8/2021 that he is experiencing new dyspnea on exertion. His PFT done during his visit on 6/17/2021 showed FVC and FEV1 values of 73% and 78% predicted respectively, which were satisfactory, yet his MIP was low at -31 cm H2O. Low MIP indicates diaphragmatic weakness and this, along with his new symptoms of dyspnea on exertion, makes him eligible to receive noninvasive ventilation (NIV) with BiPAP or other device. Early initiation of NIV in ALS is critically important and can prolong survival and meaningfully improve quality of life, therefore I recommend it based on the recent [employer] encounter. In addition, the patient reports difficulty clearing secretions and in my opinion, a cough assist device is also medically necessary and should be started at this point. Kamari Wolf MD, FAAN            Again, thank you for allowing me to participate in the care of your patient.      Sincerely,    Kamari Wolf MD

## 2021-06-17 NOTE — PATIENT INSTRUCTIONS
OT evaluation today  Follow up 6 months    Have your PFT done today.    Please call [facility] @ [phone] for questions or concerns during regular business hours. For a more efficient way to communicate, use [employer] and address the message to your physician. Remember, MyChart is only read during business hours. Do not leave urgent messages on voicemail or [facility]. If situation is urgent, contact the Neurology Clinic @ [phone] and ask to speak to a Triage Nurse or Call 911 or visit an Emergency Department.    Please call your pharmacy if you need a medication refill. They will send us an electronic message.

## 2021-06-17 NOTE — NURSING NOTE
Chief Complaint   Patient presents with     Als     [facility] RETURN ALS/MOTOR NEURON        Curtis Bolden, EMT

## 2021-07-01 NOTE — PROGRESS NOTES
Hand Therapy Initial Evaluation     Current Date: 7/1/2021    Diagnosis: Bilateral hand weakness due to ALS   DOI: 6/21/2021 (therapy referral for left resting splint for night use and Oval 8 splints for PIP ext)    Subjective:  Patient Health History  Gera Garsia being seen for ALS.     Problem began: 2/10/2019.   Problem occurred: insideous onset     General health as reported by patient is fair.  Pertinent medical history includes: none.   Red flags:  None as reported by patient.  Medical allergies: none.       Current medications:  Other. Other current medications: see list in EMR.    Current occupation is Retired.                   Occupational Profile Information:  Right hand dominant  Prior functional level:  independent-have help in some areas  Patient reports symptoms of weakness/loss of strength  Special tests:  none.    Previous treatment: seen for L HBTS 10/2020 and L SARAVANAN [facility] and R HBTS 4/7/2021  Barriers include:none  Mobility: No difficulty  Transportation: drives    Functional Outcome Measure:  Upper Extremity Functional Index  SCORE:   Column Totals: 60/80  (A lower score indicates greater disability.)    Objective:  Pain Level (Scale 0-10)   7/1/2021   At Rest 0   With Use 0   Reports no pain    Edema    None     Sensation    WNL throughout all nerve distributions; per patient report    ROM   Significant thenar and thumb adductor atrophy, unable to oppose thumb to fingers left hand due to weakness.  Also wrist and finger extension is weaker in past few months per patient report. Unable to fully extend fingers activity, full passive extension.    Strength    Not assessed    Assessment/Plan:  Patient's limitations or Problem List includes:  Decreased ROM/motion and Increased edema of bilateral wrists and hands which interferes with the patient's ability to perform Self Care Tasks (dressing, eating), Work Tasks, Recreational Activities and Household Chores as compared to previous level of  function.    Rehab Potential:  Good - Return to full activity, some limitations    Patient will benefit from skilled Occupational Therapy to increase overall strength and positioning to return to previous activity level and resume normal daily tasks and to reach their rehab potential.    Barriers to Learning:  No barrier    Communication Issues:  Patient appears to be able to clearly communicate and understand verbal and written communication and follow directions correctly.    Chart Review: Chart Review and Simple history review with patient    Identified Performance Deficits: dressing, home establishment and management, meal preparation and cleanup, work and leisure activities    Assessment of Occupational Performance:  5 or more Performance Deficits    Clinical Decision Making (Complexity): Moderate complexity    Treatment Explanation:  The following has been discussed with the patient:  RX ordered/plan of care  Anticipated outcomes  Possible risks and side effects    Treatment Plan:    Frequency:  1 x visit  Duration:  NA; 1 x visit    Orthotic Fabrication:  Finger and forearm based orthoses    Discharge Plan:  Achieve all LTG.  Independent in home treatment program.  Reach maximal therapeutic benefit.    Home Exercise Program:  Wear SARAVANAN resting orthosis for left hand sleeping to keep fingers extended and prevent flexion contracture   Wear Oval 8 orthosis left index finger with wrist support to allow hunt and pecking at keyboard (trialed all fingers supported but did not improve function)  Pt considering purchase of Saebo glove to assist finger extension on left hand, therapist assisted with measurements for sizing

## 2021-07-06 NOTE — PROGRESS NOTES
Social Work -ALS Clinic Progress Note  [facility]    Data/Intervention:    Patient Name:  Gera Garsia  /Age:  1955 (66 year old)    Visit Type: telephone    Collaborated With:    -Gera and SAM Bradford    Psychosocial info/Concerns:   We had a planned call today to discuss health care directives and assess for other psychosocial needs.  Gera and Michell both have their own homes and they go between both homes now. Michell is working from home. Gera is retired. He worked in the [employer] dept at a [employer].  Gera is able to drive and is independent with mobility. He and Olivia have been together 23 years. He has 2 children (and grandchildren) that he isn't very close to. Michell has 1 adult child. Pt has brothers that are supportive. He does not belong to any groups or Mandaen organizations but states he believes in God.   Michell is a list person so has developed a list of things to address for Gera including a health care directive. They are also getting started on a Will. It's important to Gera that he appoints Michell as his health care proxy as he doesn't want his children interfering in his health care decisions.   He receives social security nursing home and a pension and feels in good shape financially. His insurance he reports has been good.  Both report that Gera is coping well with ALS. It's hard but he is adjusting. He would like to do some travel before it worsens.    Intervention/Education/Resources Provided:  Reviewed his current living situation, support and finances.   Michell's plan is to be his caregiver.  He is interested in a disabled parking placard. Discussed [employer] meals but not needed at this time.  Reviewed each question on the health care directive to help de mystify the document. As we went thru it, it helped Gera recognize what his goals are and he will sit down and fill it out. Also mailed a verbal ANJANA form for him to  complete.  Discussed ALSA services/resources.     Assessment/Plan:  Will request Neelam Mckeon's assistance on the disability parking application.   Pt will send me a copy of his health care directive when it's completed.    Provided patient/family with contact information and encouraged them to contact me between clinic visits as needed.     YARELI Deleon, [facility]    [facility]  [phone]

## 2021-07-09 NOTE — ADDENDUM NOTE
Encounter addended by: Tae Chu RN on: 7/9/2021 1:30 PM   Actions taken: Charge Capture section accepted

## 2021-07-09 NOTE — TELEPHONE ENCOUNTER
EKG obtained during clinical trial visit yesterday reported as demonstrating a q-wave in V1, V2, indicating possible prior septal infarction, not noted on screening visit EKG in July. I reviewed with on-call cardiology, who agreed that the small R-wave previously seen is no longer evident and that it could reflect a change in lead placement.     I contacted the patient, who denied any history of MI or chest pain, palpitations, or chest tightness in the past or since starting participation in the clinical trial.     In my opinion the likelihood this represents a new medical development is intermediate to low and the likelihood of a relationship to the investigational product is low.     Plan:    1. Repeat EKG. Patient will go to [facility] in [place] Monday and I am copying his PCP. If a persistent Q-wave is noted, will obtain cardiology consult.  2. Patient advised to present to ED should he develop chest pain, tightness, or palpitations.     Copying this note to PCP, neurologist, and .    Anish Tan M.D.

## 2021-07-09 NOTE — ADDENDUM NOTE
Encounter addended by: Cris Bolden RN on: 7/9/2021 7:21 AM   Actions taken: Charge Capture section accepted

## 2021-07-14 NOTE — TELEPHONE ENCOUNTER
Requested Prescriptions   Pending Prescriptions Disp Refills     apixaban ANTICOAGULANT (ELIQUIS) 5 MG tablet 120 tablet 0     Sig: Take 1 tablet (5 mg) by mouth 2 times daily       Direct Oral Anticoagulant Agents Failed - 7/14/2021  8:14 AM        Failed - Normal Platelets on file in past 12 months     Recent Labs   Lab Test 06/29/20  1359                  Passed - Medication is active on med list        Passed - Patient is 18-79 years of age        Passed - Serum creatinine less than or equal to 1.4 on file in past 12 mos     Recent Labs   Lab Test 05/06/21  0931   CR 0.74       Ok to refill medication if creatinine is low          Passed - Weight is greater than 60 kg for the past year     Wt Readings from Last 3 Encounters:   06/17/21 66.7 kg (147 lb)   06/11/21 66.9 kg (147 lb 7.8 oz)   05/28/21 67.3 kg (148 lb 5.9 oz)             Passed - Recent (6 mo) or future (30 days) visit within the authorizing provider's specialty           Routing refill request to provider for review/approval because:  Failed protocol.  Lyla Jeong BSN-RN  [facility]

## 2021-07-15 NOTE — TELEPHONE ENCOUNTER
Patient should have completed eliquis for 3 months for his first DVT and does not need to be on long term eliquis.  Why is patient asking for a refill?

## 2021-07-15 NOTE — TELEPHONE ENCOUNTER
I called and spoke to patient, he confirms he is no longer on the Eliquis.    I see end date of 6/25/21 was advised per 4/29/21 office visit with Dr. Jones.    I removed Eliquis from [employer] med list.    I called [facility], spoke to pharmacist, they removed Eliquis from profile.    Michell Vela RN  [facility]

## 2021-08-20 NOTE — PROGRESS NOTES
"    OUTPATIENT OCCUPATIONAL THERAPY CLINIC NOTE  Gera Garsia  YOB: 1955  [id number]    Type of visit:  Evaluation            Date of service: 8/20/21    Referring provider: Dr. Kamari Wolf     present: No  Language: english    Others present at visit:   Michell CHAU    Medical diagnosis:   Amyotrophic lateral sclerosis (ALS)     Date of diagnosis: 10/01/20     Pertinent medical history:  Onset of fasiculations in L upper limb 1 year ago and then spread to R and then developed distal Upper limb weakness and trouble with buttoning. Now troubles with lifting.     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    Cardio-respiratory status:  Forced vital capacity: 73%, MIP -31    Height/Weight: 5' 10\" / 147 lb    Living environment:  [place], [place]    Living environment barriers:  3 stairs to enter (no railings present)  Full stairs within home has installed railed to basement since his fall and has rail to upstairs    3 story               BED AND BATH ON MAIN, TUB/SHOWER , NO BARS, tub is to the right of toilet   Toilets regular height,. Plans to install higher toilet in his home; At girlfriends now has  higher height toilet    Current assistance/living environment:  Lives alone, S.O., Michell, with pt a lot when not working from her home      Current mobility equipment:  Cane, not using today but has one now  handicap parking    Current ADL equipment:  Button hook, elastic laces, nail clipper board   L custom thumb splint and R; L wrist sock up splint   Bottle and can tab openers   Zipper ring pulls   L Saebo glove for fnger/thumb extension   Thumb palmar abd splints   L resting hand splint      Technology used: computer, tends to use minimally and \"hunts and pecks with index fingers\" at " baseline    Patient concerns/goals: wants something to keep his L fingers straight as they flex in at night and are very stiff in the morning    Evaluation   Interview completed.   Pain assessment:  Pain denied    Range of motion:  UE: AROM WFL with exception of end range limitations now at shoulders due to kyphotic posture ; L thumb opposition limited due to intrinsic weakness with thumb webspace atrophy; L wrist extensor AROM impaired and finger ext impaired with fingers remaining flexed at in mid position all joints. Able to flex fingers/thumb in 4-/5.R hand can oppose to 3rd digit only due to intrinsic wasting    Manual muscle testing: UE: Shoulders and elbows at least 3/5; Manual  force 4-/5 weak on L, stronger on R    Cognition:   WFL    ADL:   Feeding self:  Cutting food hard due to holding fork on L hand is difficult but has new method to manage and can do Ind  Grooming: Ind  UE Dressing:  Ind Buttons hard with weak L hand, has button hook now  LE Dressing:  Ind has new zipper pull on zipper on jeans, socks harder to pull on due to hand weakness, tying laces is hard, slip on shoes today  Showering/bathing: Ind-stands  Toileting/transfer:  Ind-more difficult, suction cup bar    IADL:   Home management:  Does all, S.O. helps, noting he plans to hire outdoor work now as getting too fatiguiing  Driving:  Automatic, drives fine per report  Occupation: retired, worked for [employer] in [employer]  Emergency Call system:phone      Fall Risk Screen:   Has the patient fallen 2 or more times in the last year? No      Has the patient fallen and had an injury in the past year? Yes, past fall down the  stairs, R RAMIRO gave out       Timed Up and Go Score: NT today, amb slowly, dyspnea noted, wider based gait with proximal weakness apparent  Notes R RAMIRO fasciculations and knee arthritis now that limits stairs  Is the patient a fall risk? Yes     Impairments:  Fatigue  Muscle atrophy  Coordination  Range of  "motion  Balance  Respiratory compromise     Treatment diagnosis:  Impaired activities of daily living  Impaired IADL    Assessment of Occupational Performance: 3-5 performance deficits  Identified Performance Deficits (ie: feeding, social skills): dressing, eating, home management, toilet transfers, driving, grooming, showering,   Clinical Decision Making (Complexity): Mod complexity     Recommendations/Plan of care:  Patient would benefit from interventions to enhance safety and independence.  Rehab potential good for stated goals.  Occupational therapy intervention for  self care/home management, orthotics and for therapeutic exer/procedure  1 session evaluation & treatment.    Goals:   Target date: today  Patient, family and/or caregiver will verbalize understanding of evaluation results and implications for UE HEP for stretching/ROM to aid functional performance and prevent contracture.  Patient, family and/or caregiver will verbalize/demonstrate understanding of compensatory methods /equipment to enhance functional independence and safety.  Patient, family and/or caregiver will verbalize/demonstrate understanding of Saebo fit and use techniques/equipment to aid hand function      Educational assessment/barriers to learning:  No barriers noted      Treatment provided this date:   Self care/home management,  10 minutes of training in:  Functional use of L hand for unilateral and bilateral tasks using gross grasp and 3 point pinch using new Saebo glove which allowed for grasp and release of a 3 inch jar and removal of lid with R hand with training in Dycem, non-slip matting around jar to help with prehension with weak L grasp. Issued Dycem to pt for home use. Manipulation of 1 x 1\" items with L hand on tabletop and stapler and other items using L hand.   Trained patient in how breathing/diapraghm is affected in ALS which contributes to respiratory fatigue as pt inquiring why he gets SOB. Training to consider BiPAP " "use more frequently during the day for rest when needed and shower chair to sit with showering. Declines shower chair at this time.  Pt inquired about thicker secretions and educated about options for thinning secretions and cough assist that he could speak with Dr. Wolf about and he did indicate he has sent him a My Chart message 2 days ago and awaiting reply.    Orthotic management/fit and training: 15 min of assessment, fit and adjustment of patients new L Saebo finger extension glove orthotic to aid dynamic finger ext. Dependent to don splint with training in how to do this, adjustment made to proximal forearm strap to aid wrist ext support in neutral. Did not use bands on index and 5th digit as then pt could not flex fingers, but was glove material allowed for return to ext from active flexion. Did use bands on 2nd and 3rd digits for finger ext and on thumb to promote ext and radial abd for gross grasp and release and 3 point pinch. Pt to take photo with his phone for awareness of how to don and also for recall of correct position of bands.      Therapeutic Procedure: 25 minutes of training in:  UE self-ROM and stretches to completed for contracture prevention for shoulders and hands. Demonstrated these for patient and manually provided assist and cues for correct technique and pt able to return demonstrate accurately. Completed 3-5 reps of each for return demonstration purposes:  L Composite finger and thumb flexion/ext-each digit  Wrist/finger extension stretches for L side  L thumb abd/add  Supine on foam wedge-shoulder flexion Oliver-hands clasped, ext rotation with hands behind head; horiz abd and trial of thoracic towel roll stretch due to kyphotic postural changes from weakness. \"My s.o. keeps asking why I am slumping when I am walking.\"  Issued handouts (VHI and also PTRx) for HEP for pt use for HEP)    Response to treatment/recommendations: very receptive.    Goal attainment:  All goals met    Risks " and benefits of evaluation/treatment have been explained.  Patient, family and/or caregiver are in agreement with Plan of Care.     Timed Code Treatment Minutes: 50  Total Treatment Time (sum of timed and untimed services): 65 min    Signature: IRENE Hollins, [facility]   Date: 8/20/21       Certification:  Onset date: 8/20/21  Start of care date: 8/20/2021  Certification date from 8/20/2021 to 8/20/21    I CERTIFY THE NEED FOR THESE SERVICES FURNISHED UNDER        THIS PLAN OF TREATMENT AND WHILE UNDER MY CARE     (Physician co-signature of this document indicates review and certification of the therapy plan).

## 2021-09-03 NOTE — ADDENDUM NOTE
Encounter addended by: Alaina García on: 9/3/2021 1:43 PM   Actions taken: Charge Capture section accepted

## 2021-09-07 NOTE — ADDENDUM NOTE
Encounter addended by: Umu Lawson RMA on: 9/7/2021 7:16 AM   Actions taken: Charge Capture section accepted

## 2021-09-07 NOTE — TELEPHONE ENCOUNTER
Nutrition Services:     Called Gera and spoke with his wife Michell today per request of Dr. Wu re weight loss and loss of appetite.     Bernadine tells me that he has been eating much better since he started using his cough assist and BiPap today.  She has noticed a big difference which has been a relief for both of them.      She tells me that he has been drinking Boost Mobility, ~2/day.   These shakes have 180 belinda, 20g protein.     He is also taking Whey protein powder/andrzej.  She misread how to make the shakes, thus, he has not been getting the calories from this that she thought he was getting.      She tells me that he has been eating all food textures.  He does not have any difficulty with chewing or swallowing, just loss of appetite with eating fatigue and breathing difficulty.     RD reviewed nutrition shakes to try with more calories/protein for same volume (ie Ensure Enlive, Boost Plus).      Interventions/recommendations:  RD reviewed belinda/protein needs of >2500 calories and >90g protein/day.   Encouraged small, frequent meals.  Advised to increase ONS volume from 2/day to 3/day and switch to high calorie shake.  Advised to start tracking daily intake on notebook/journal to ensure adequacy for weight repletion or weight maintenance at minimum.   RD resent nutrition education handouts via email for their review.     Advised to call/message with further nutrition questions or concerns.     Shari Phillips RDN, ELLIOTN  [facility]   [phone]

## 2021-09-07 NOTE — PROGRESS NOTES
MET PT AND HIS S.O. AT HIS HOME. WE BEGAN WITH THE COUGH ASSIST SET-UP. PT HAD MANY QUESTIONS, BUT REALLY WAS HAPPY ABOUT THE SUPPORT THE COUGH ASSIST OFFERED, STATING HE LIKED HOW HIS LUNGS FELT OPEN. I ASKED ABOUT HUMIDITY AND PT DECLINED AT THIS TIME, SAYING MAY BE WHEN IT'S NOT SO HUMID OUT HE'LLNEED IT. I ASSURED HIM WE CAN SET THAT UP AT A LATER DATE IF HE WANTS. MOVING ON TO THE NIV, I EXPLAINED THE IMPORTANCE OF USING THIS DEVICE NIGHTLY. THEY WERE UNDERTHE IMPRESSION THAT IT WAS ONLY NECESSARY FOR 1-2 HOURS, BU TI ASSURED THEM THAT WAS PROBABLY INTROIDUCED TO THEM THAT WAS AS IT TAKES SOME PEOPLE MORE TIME TO GET USED TO THE DEVICE, AND THAT 1-2 HOURS ARE BETTER THAN NONE, BUT THE GOAL IS TO WEAR IT ALL NIGHT WHILE SLEEPING. WE DISCUSSED ALS AND HOW THE MUSCLES BECOME WEAKER, SO DURING THE DAY THEY ARE WORKING WITHOUT SUPPORT, AND USING THE DEVICE AT NIGHT WILL ALLOW THOSE MUSCLES TO REST AND RECOVER. HE SEEMS ACTIVE, GOES ON A BIKE RIDE DAILY SO THAT HELPS. I LET HIM KNOW THIS DEVICE WON'T NECESSARILY INCREASE THE STRENGTH OF HIS MUSCLES BUT ALLOW THEM TO REST SO THEY WON'T TIRE OUT SO QUICKLY. WE TALKED ABOUT WHY HE WAKES UP SO TIRED, AND HOW HIS ALS PLAYS A PART IN THAT (AND HIS WEAKENED RESPIRATORY MUSCLES ARE BEING USED THROUGHOUT THE NIGHT TO KEEP HIM BREATHING). PT SEEMS VERY EXCITED TO HAVE THIS EQUIPMENT AVAILABLE TO HIM. WE WENT OVER THE NIV, HOW TO CONNECT THE MODEM TO IT, HOW TO CONNECT THE TUBING AND MASK, HOW TO TURN IT OFF AND ON, AND SILENCE AN ALARM. I EXPLAINED THAT ALTHOUGH I WILL BE THE MAIN RT FOLLOWING ALONG IN HIS CARE, THEY CAN CALL AND SPEAK TO ANY OF US TO GET HELP WITH ANY ISSUES. I TOLD PT I WOULD CALL IN A FEW DAYS TO CHECK ON HIM. PT HAD NO MORE QUESTIONS AT THIS TIME.    COUGH ASSIST: PT AND PT'S WIFE WERE INSTRUCTED IN THE IMPORTANCE OF THE COUGH ASSIST, AND WHAT IT SHOULD DO IF USED CORRECTLY. PT'S SETTINGS ARE ABOVE, AND HE SAID THEY FELT FINE, NOT TOO UNCOMFORTABLE.. PT  "STATED \"I LIKE THOW IT OPENS MY LUNGS UP, IT FEELS GOOD\". I EXPLAINED THAT THERE ISN'T ANY PRESCRIBED AMOUNT OF TIMES TO USE IT, BUT I RECOMMENDED 3 ROUNDS, 5 BREATHS, TRY COUGHING IN BETWEEN EACH ROUND (AS NEEDED). HE ASKED IF HE COULD USE IT AFTER A BIKE RIDE OR WHEN HE FEELS SHORT OF BREATH, AND I SAID YES, BUT EXPLAINED THE DIFFERENCE BETWEEN THE COUGH ASSIST AND THE NIV, AND THAT MAYBE USING THE NIV AFTER EXERCISE WILL BE MORE BENEFICIAL. I SHOWED THEM BOTH HOW TO TURN IT OFF/ON AND HOW TO BEGIN/END THERAPY. I EXPLAINED ABOUT CLEANING THE MASK/TUBING. PT HAD NO MORE QUESTIONS.  "

## 2021-10-25 NOTE — PROGRESS NOTES
WENT TO PT'S HOMEF OR MOPNTY VISIT. HE SEEMS MORE FRAIL THAN WHEN I SAW HIM LAST, AND HE STATED HE CAN NO LONGER GO ON BIKE RIDES BECAUSE HE DOESN'T HAVE THE STRENGTH. HE REALLY STILL LOVES USING THE COUGH ASSIST AND THE NIV. HE SLEEPS BETTER AND THE COUGH ASSIST STILL HELPS MOBILIZE SECRETIONS. AS OF NOW, HE IS STILL ABLE TO PUT THE MASK ON BY HIMSELF AND TURN THE MACHINE ON. I CHANGED HIS FILTERS AND BROUGHT HIM A CUSHION FOR HIS FACE MASK. I HAD HIM WEAR THE VENT FOR A FEW MINUTES SO I COULD MONITOR HIM. HE HAD NO MORE QUESTIONS. I REMINDED HIM TO CALL WITH ANY QUESTIONS OR PROBLEMS AND ALSO THAT IF HE DECIDES HE NEEDS HUMIDITY, I CAN SET THAT UP FOR HIM ALSO.       PRESSURE 10.4  PIP 10.8  PEEP 5.2  VA 9.8  MVE 10.4    RR 19  T1 1.4

## 2021-11-05 NOTE — TELEPHONE ENCOUNTER
Message from  @ [facility] ([phone]) re: BiPAP issues.  will call [facility] at [phone] to have settings adjusted. Also confirmed with [facility] that they are working with [place] on the issue.

## 2021-11-23 NOTE — TELEPHONE ENCOUNTER
Reason for Call:  Other     Detailed comments: Patient is requesting delay of start of care for Skilled nursing visits and PT/OT until 11/27/2021    Phone Number   [facility]  [phone]         Best Time:     Can we leave a detailed message on this number? YES    Call taken on 11/23/2021 at 10:30 AM by Breanna English

## 2021-11-23 NOTE — TELEPHONE ENCOUNTER
Routed to covering providers for verbal ok.    Lyla RN,BSN  Triage Nurse  [facility]: [facility]  Ph: [phone]

## 2021-11-23 NOTE — TELEPHONE ENCOUNTER
Spoke with Brenda- [facility]  Home care, gave verbal Ok for orders below. Okay to start 11/27/21.  She voiced understanding and agreement  Mariaelena Watson RN  [facility]

## 2021-12-01 NOTE — PROGRESS NOTES
PT HAS ALS. WENT TO HIS HOME TO CHANGE SUPPLIES, GO OVER COUGH ASSIST AND TROUBLESHOOT MASK ISSUES. HE HAS BEEN USING THE COUGH ASSIST, BUT I DID SHORTEN THE INH TIME (1.5 SEC)  AND PAUSE TIME (1 SEC). I TOLD HIM WE MAY NEED TO LOOK INTO ADDING OSCILLATION BUT THAT MIGHT REQUIRE A NEW RX. WHEN I ASKED HIM ABOUT THE MASK PROBLEMS HE SAID HE THINKS LENA STRAPS IT ON TOO TIGHT AND IT LEAKS, IS UNCONFORTABLE AND HE TAKES IT OFF. I FIT HIM WITH A NEW MASK (SAME STYLE) AND EXPLAINE DTHAT IT DOESN'T NEED TO BE AIR TIGHT, THAT SOME SMALL LEAK IS NORMAL AND OK. I TOLD HIM I WOULD CALL LENA AND EXPLAIN WHAT WE TALKED ABOUT.    DX: ALS  VITALS: VITALS: PULSE: BP: O2 SATS: R/A - O2 LPM RR: WGT: YES OR NO  ETCO2: ON VENTILATOR OFF VENTILATOR: N/A   BREATH SOUNDS: CLR  SECRETIONS: COLOR: CONSISTENCY: COUGH EFFECTIVENESS: N/A  LOC: ALERT ORIENTED CONFUSED LETHARGIC OBTUNDED: ALERT    SETUP DATE: 08/07/2021  DEVICE TYPE: ASTRAL 150  SETTINGS (include mode): ST, IPAP 10, EPAP 5, RR 14  COMFORT SETTINGS: RISE TIME 300, TRIGGER MEDIUM, CYCLE 10%  INTERFACE: FFM  CIRCUIT/HUMIDITY: SINGlE W/LEAK, NO HUMIDITY  ALARMS: APNEA 60 SEC  O2 BLEED IN (YES/NO): NO    VENTILATOR INSPECTION DONE: YES SETTINGS CHECK: YES  ALARM CHECK: YES   VENTILATOR SETTINGS VERIFIED: YES   CIRCUIT CHECK: YES : YES OR NO CIRCUIT SUPPLIES GIVEN: YES   MASK: TYPE: CONDITION: REPLACED: YES, WORN  FILTERS: EXTERNAL CHANGED W/CIRCUIT YES, SPARE FILTER LEFT   OXYGEN EQUIPMENT REVIEWED IF APPLICABLE: FILTERS: SUPPLIES:

## 2021-12-23 NOTE — PROGRESS NOTES
"CLINICAL NUTRITION SERVICES - ASSESSMENT NOTE    Gera Garsia 66 year old referred for MNT related to ALS  Visit Type: initial in-person  Pt accompanied by: his wife, Michell  Referring Physician: Britney     NUTRITION HISTORY  Factors affecting nutrition intake include:decreased appetite  Current diet/appetite: general diet/poor appetite  Denies dysphagia, denies difficulty feeding self    Gera tells me that his primary barrier to eating is lack of appetite.  He tells me that 'I just don't have the desire to eat anymore'.    He has been drinking 2 Boost mobility or Boost Plus daily in addition to his 3 meals/day.   His meals have been smaller in volume but still attempts to eat something.   He is able to feed self with regular utensils at this time.     Diet Recall  Breakfast 2 pc toast with butter, 1 cup whole milk, 1/2 cup orange juice   Lunch Salad with protein, variety of vegetables   Dinner Tacos, spaghetti with meat sauce     ANTHROPOMETRICS  Height: 68\"  Weight: 132 lbs/59kg  BMI: 20  Weight Status:  Normal BMI (very low range normal)  IBW: 156 lb (84%)  Weight History: down 16 lb (11%) x past 6 months  Wt Readings from Last 7 Encounters:   12/23/21 59.9 kg (132 lb)   09/03/21 62.6 kg (138 lb)   06/17/21 66.7 kg (147 lb)   06/11/21 66.9 kg (147 lb 7.8 oz)   05/28/21 67.3 kg (148 lb 5.9 oz)   05/14/21 67.6 kg (149 lb 0.5 oz)   04/29/21 70.1 kg (154 lb 9.6 oz)     Dosing Weight: 60kg    Medications/vitamins/minerals/herbals:   Reviewed    Labs:   Labs reviewed    ASSESSED NUTRITION NEEDS:  Estimated Energy Needs: 2100 kcals (35 Kcal/Kg)  Justification: repletion  Estimated Protein Needs: 72 grams protein (1.2 g pro/Kg)  Justification: Repletion  Estimated Fluid Needs: 2100  mL   Justification: 1mL/kcal    MALNUTRITION:  % Weight Loss:  > 10% in 6 months (severe malnutrition)  % Intake:  <75% for >/= 3 months (moderate malnutrition)  Subcutaneous Fat Loss:  Orbital region moderate depletion and Thoracic " region moderate depletion  Muscle Loss:  Temporal region moderate depletion  Fluid Retention:  None noted    Malnutrition Diagnosis: Severe malnutrition  In Context of:  Chronic illness or disease    NUTRITION DIAGNOSIS:  Inadequate oral intake related to decreased appetite as evidenced by 16% wt loss x past 6 months    INTERVENTIONS  Provided written & verbal education:     - Reviewed nutrition needs.   Advised pt to aim for at least 2100kcal and 75g protein daily.     - Discussed strategies to help fortify meals and snacks with calories.   - Encouraged to focus on small, frequent meals.   - Reviewed oral nutrition supplement options (Pro Performance Bulk 1340, Ensure Complete, Boost VHC etc). Showed link online and provided printed resource/spec sheet for pp bulk.   - Encouraged utilizing these ONS in home made shakes/smoothies to prevent flavor fatigue.    - Encouraged to work with OT in near future if feeding self becomes more difficult    Pt verbalize understanding of materials provided during consult.   Patient Understanding: good  Expected patient engagement: good    Goals  1.  Aim for 5-6 small frequent meals  2.  Aim for 2100kcal and 75g protein/day  3. Weight maintenance     Follow-Up Plans: Pt has RD contact information for questions.      MONITORING AND EVALUATION:  -Food/beverage intake  -Weight trends    Shari Phillips, [place], LD

## 2021-12-23 NOTE — PATIENT INSTRUCTIONS
Please call [facility] @ [phone] for questions or concerns during regular business hours. For a more efficient way to communicate, use [employer] and address the message to your physician. Remember, MyChart is only read during business hours. Do not leave urgent messages on voicemail or Entrenarmet. If situation is urgent, contact the [facility] @ [phone] and ask to speak to a Triage Nurse or Call 911 or visit an Emergency Department.    Please call your pharmacy if you need a medication refill. They will send us an electronic message.

## 2021-12-23 NOTE — PROGRESS NOTES
Service Date: 12/23/2021    HISTORY OF PRESENT ILLNESS:  I had the pleasure to evaluate Mr. Garsia at the [facility].  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  I last saw him in clinic in 06/2021, and until that time, his progression was relatively slow.  However, things have worsened markedly in the last 6 months.  In late 08/2021, Mr. Garsia told me that he was developing dyspnea on exertion and mild orthopnea.  I suspected that this was due to diaphragmatic weakness because his PFT in 06/2021 had shown a low MIP at -31, yet his vital capacity was still relatively preserved at 73% of normal and FEV1 was 78%.  I then prescribed him BiPAP with instructions to use it consistently at night.  However, during September and October 2021, he did not use the BiPAP as he was instructed to, and unfortunately his dyspnea worsened and he ended up at [facility] in 11/2021, with acute on chronic hypercapnic respiratory failure.  He was desatting down to the 80s.  He also has a remote history of pulmonary embolism and DVT for which he was adequately treated with anticoagulation for 6 months.  His VBG showed a chronic compensated respiratory acidosis.  He was put back on BiPAP and improved.  He did not require intubation.  He now feels the need to use the mask for at least 2 hours during the day and every night, and this has definitely helped.      He has lost 28 pounds of weight.  He is now down to 132.  He was 158 at the beginning of the year.  His appetite is reduced.  He denies dysphagia, dysarthria, sialorrhea, difficulty chewing, ptosis, diplopia, pseudobulbar affect or head drop.  He does not have a lot of muscle pain or cramping.  His strength has clearly worsened.  He still can cut his food, but he has some difficulties holding a pen unless it is a modified one.  He  can brush his teeth on his own, although his weakness has progressed in the right hand as well. There is very limited use of the left arm or hand.  He is also noticing more unsteadiness and fatigue when walking. He had only 1 fall yesterday, and yesterday he began using a walker.  Until recently he was not using any assistance.  He does not trip on his foot, and he denies major difficulties getting up from the chair, although he often has to use his arms to do so.      He had liver function tests in 11/2021 that were normal.  He is on riluzole 50 mg b.i.d.  He has previously declined Radicava.  He is also participating in the [facility] ALS trial at the [facility].      He denies excessive secretions.    /69   Pulse 63   Resp 16   Wt 59.9 kg (132 lb)   SpO2 94%   BMI 19.94 kg/m      PFT Latest Ref Rng & Units 12/23/2021   FVC L 1.79   FEV1 L 1.70   FVC% % 40   FEV1% % 50     MIP -20 cm H2O.     PHYSICAL EXAMINATION: Mr. Garsia appears cachectic in Clinic. He uses accessory muscles to breathe and he is tachypneic. He has no ptosis or ophthalmoparesis.  His tongue shows mild fasciculations and atrophy, but genioglossus strength is at most minimally weak on both directions.  Cheek puff is also mildly weak.  Orbicularis oculi strength is normal or very mildly weak as above.  Jaw strength is normal.  Neck flexion is 4/5, extension is 5.  Strength in the limbs is as follows on MRC scale, right/left:  Deltoid 4-/3, biceps 4-/4-, triceps 4+/4, wrist extensors 4-/0, finger extensors 4-/0, FDI 1/0, APB 0/0, hand  4/3 to 4-, iliopsoas 4/4-, quadriceps 5/5, hamstrings 4+/5, tibialis anterior 3 to 4- on the right, 3- on the left.  There was profound muscle atrophy and fasciculations throughout.    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  His PFT today showed a sharp decline from the last visit, and overall he has experienced a quite steep progression of his ALS in the last 6  months, which is definitely concerning. We discussed advanced directives today and he filled the POLST form.  He would like comfort care with medications administered at home, and he would like to avoid the hospital.  He declined gastrostomy, intubation or resuscitation.  I encouraged him to use BiPAP more during the day for 3-4 hours or even more as necessary.  I offered him enrollment into hospice, but he is not interested yet.  For now, he will continue the riluzole and we will not get repeat blood draw today, since liver function tests were checked last month and were fine.  He will be seen by our physical and occupational therapist today as well as our nutritionist.  He has lost a large amount of weight and declines gastrostomy. He is on mirtazapine for appetite stimulation at 22.5 mg daily.  I will increase his dose to 30 mg.  I offered him medical cannabis, but he declined. I would not use Megace because of his history of DVT.  I will see him in followup in 3 months or sooner if needed.    Billing is level 5 (high) based on: 1) Problems assessed: One or more chronic illnesses with severe exacerbation, progression or side effects of treatment, posing threat to life (ALS with marked respiratory failure and 2) Risk: high, decision not to resuscitate or to deescalate care because of poor prognosis -see POLST discussion above.    Kamari Wolf MD    ADDENDUM (12/27/2021): Following face to face evaluation on 12/23/2021, I conclude that Mr Garsia is unable to walk more than 100 ft without having to stop and this is due to respiratory failure from ALS in addition to increasing leg weakness causing unsteady gait. Under those circumstances, I believe that a power wheelchair is medically necessary to allow Mr Garsia to perform activities of daily living at his house. Therefore a fully motorized wheelchair should be offered to him and should be covered by his insurance plan. GM    D: 12/23/2021   T: 12/23/2021    MT: ll    Name:     GERA GARSIA  MRN:      [id number]        Account:      [id number]   :      1955           Service Date: 2021       Document: [id number]    ADDENDUM (2022): I was notified by my colleague Dr Tan and research coordinator (Mr Garsia was a participant in the [facility] trial) that Gera  suddenly on 2022. I called his significant other Michell Bradford today and expressed my condolences. She shared with me that Gera was sounding ok during the day (he was alone at home when this happened), he was walking around and had his lunch, and he did not report any worsening or new symptoms to her when they spoke in the morning. She found him collapsed and pulseless when she entered his house in the evening. Paramedics resuscitated him for 15+ minutes unsuccessfully. He was defibrillated apparently. Paramedics thought he had a heart attack but there is no evidence this was confirmed by EKG and he did not make it to the hospital to get testing to confirm this. Autopsy was not done. I offered emotional support to her and explained I will notify the research personnel. Kamari Wolf mD

## 2021-12-23 NOTE — LETTER
12/23/2021       RE: Gera Garsia  [address] [facility] [address]     Dear Colleague,    Thank you for referring your patient, Gera Garsia, to the [facility] [place] [facility]. Please see a copy of my visit note below.    Service Date: 12/23/2021    HISTORY OF PRESENT ILLNESS:  I had the pleasure to evaluate Mr. Garsia at the [facility].  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  I last saw him in clinic in 06/2021, and until that time, his progression was relatively slow.  However, things have worsened markedly in the last 6 months.  In late 08/2021, Mr. Garsia told me that he was developing dyspnea on exertion and mild orthopnea.  I suspected that this was due to diaphragmatic weakness because his PFT in 06/2021 had shown a low MIP at -31, yet his vital capacity was still relatively preserved at 73% of normal and FEV1 was 78%.  I then prescribed him BiPAP with instructions to use it consistently at night.  However, during September and October 2021, he did not use the BiPAP as he was instructed to, and unfortunately his dyspnea worsened and he ended up at [facility] in 11/2021, with acute on chronic hypercapnic respiratory failure.  He was desatting down to the 80s.  He also has a remote history of pulmonary embolism and DVT for which he was adequately treated with anticoagulation for 6 months.  His VBG showed a chronic compensated respiratory acidosis.  He was put back on BiPAP and improved.  He did not require intubation.  He now feels the need to use the mask for at least 2 hours during the day and every night, and this has definitely helped.      He has lost 28 pounds of weight.  He is now down to 132.  He was 158 at the beginning of the year.  His appetite is reduced.  He  denies dysphagia, dysarthria, sialorrhea, difficulty chewing, ptosis, diplopia, pseudobulbar affect or head drop.  He does not have a lot of muscle pain or cramping.  His strength has clearly worsened.  He still can cut his food, but he has some difficulties holding a pen unless it is a modified one.  He can brush his teeth on his own, although his weakness has progressed in the right hand as well. There is very limited use of the left arm or hand.  He is also noticing more unsteadiness and fatigue when walking. He had only 1 fall yesterday, and yesterday he began using a walker.  Until recently he was not using any assistance.  He does not trip on his foot, and he denies major difficulties getting up from the chair, although he often has to use his arms to do so.      He had liver function tests in 11/2021 that were normal.  He is on riluzole 50 mg b.i.d.  He has previously declined Radicava.  He is also participating in the [facility] ALS trial at the [facility].      He denies excessive secretions.    /69   Pulse 63   Resp 16   Wt 59.9 kg (132 lb)   SpO2 94%   BMI 19.94 kg/m      PFT Latest Ref Rng & Units 12/23/2021   FVC L 1.79   FEV1 L 1.70   FVC% % 40   FEV1% % 50     MIP -20 cm H2O.     PHYSICAL EXAMINATION: Mr. Garsia appears cachectic in Clinic. He uses accessory muscles to breathe and he is tachypneic. He has no ptosis or ophthalmoparesis.  His tongue shows mild fasciculations and atrophy, but genioglossus strength is at most minimally weak on both directions.  Cheek puff is also mildly weak.  Orbicularis oculi strength is normal or very mildly weak as above.  Jaw strength is normal.  Neck flexion is 4/5, extension is 5.  Strength in the limbs is as follows on MRC scale, right/left:  Deltoid 4-/3, biceps 4-/4-, triceps 4+/4, wrist extensors 4-/0, finger extensors 4-/0, FDI 1/0, APB 0/0, hand  4/3 to 4-, iliopsoas 4/4-, quadriceps 5/5, hamstrings 4+/5, tibialis anterior 3 to 4- on  the right, 3- on the left.  There was profound muscle atrophy and fasciculations throughout.    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  His PFT today showed a sharp decline from the last visit, and overall he has experienced a quite steep progression of his ALS in the last 6 months, which is definitely concerning. We discussed advanced directives today and he filled the POLST form.  He would like comfort care with medications administered at home, and he would like to avoid the hospital.  He declined gastrostomy, intubation or resuscitation.  I encouraged him to use BiPAP more during the day for 3-4 hours or even more as necessary.  I offered him enrollment into hospice, but he is not interested yet.  For now, he will continue the riluzole and we will not get repeat blood draw today, since liver function tests were checked last month and were fine.  He will be seen by our physical and occupational therapist today as well as our nutritionist.  He has lost a large amount of weight and declines gastrostomy. He is on mirtazapine for appetite stimulation at 22.5 mg daily.  I will increase his dose to 30 mg.  I offered him medical cannabis, but he declined. I would not use Megace because of his history of DVT.  I will see him in followup in 3 months or sooner if needed.    Billing is level 5 (high) based on: 1) Problems assessed: One or more chronic illnesses with severe exacerbation, progression or side effects of treatment, posing threat to life (ALS with marked respiratory failure and 2) Risk: high, decision not to resuscitate or to deescalate care because of poor prognosis -see POLST discussion above.    Kamari Wolf MD    ADDENDUM (12/27/2021): Following face to face evaluation on 12/23/2021, I conclude that Mr Garsia is unable to walk more than 100 ft without having to stop and this is due to respiratory failure from ALS in addition to increasing leg weakness causing unsteady  gait. Under those circumstances, I believe that a power wheelchair is medically necessary to allow Mr Garsia to perform activities of daily living at his house. Therefore a fully motorized wheelchair should be offered to him and should be covered by his insurance plan. GM    D: 2021   T: 2021   MT: ilene    Name:     GERA GARSIA  MRN:      [id number]        Account:      [id number]   :      1955           Service Date: 2021       Document: [id number]    ADDENDUM (2022): I was notified by my colleague Dr Tan and research coordinator (Mr Garsia was a participant in the [facility] trial) that Gera  suddenly on 2022. I called his significant other Michell Bradford today and expressed my condolences. She shared with me that Gera was sounding ok during the day (he was alone at home when this happened), he was walking around and had his lunch, and he did not report any worsening or new symptoms to her when they spoke in the morning. She found him collapsed and pulseless when she entered his house in the evening. Paramedics resuscitated him for 15+ minutes unsuccessfully. He was defibrillated apparently. Paramedics thought he had a heart attack but there is no evidence this was confirmed by EKG and he did not make it to the hospital to get testing to confirm this. Autopsy was not done. I offered emotional support to her and explained I will notify the research personnel.       Kamari Wolf mD

## 2021-12-23 NOTE — TELEPHONE ENCOUNTER
Signature needed on sustaining treatment polst, order date 12/21/21.  To Dr. Jones in basket for signature.

## 2021-12-23 NOTE — PROGRESS NOTES
"    OUTPATIENT OCCUPATIONAL THERAPY CLINIC NOTE  Gera Garsia  YOB: 1955  [id number]    Type of visit:  Evaluation            Date of service: 12/23/21    Referring provider: Dr. Kamari Wolf     present: No  Language: english    Others present at visit:   Michell CHAU    Medical diagnosis:   Amyotrophic lateral sclerosis (ALS)     Date of diagnosis: 10/01/20     Pertinent medical history:  Onset of fasiculations in L upper limb Oct 11/2019 and then spread to R and then developed distal Upper limb weakness and trouble with buttoning. Now troubles with lifting. Hospitalized 11/2021 for acute on chronic hypercapnic respiratory failure. 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    Cardio-respiratory status:  Forced vital capacity: 40%, MIP -20  Has BiPAP, not with for today's visit, uses at night and at least 2 hours during the day    Height/Weight: 5' 10\" / 132 lb    Living environment:  [place], [place]    Living environment barriers:  3 stairs to enter (no railings present)-12/23/21: SO reports that [facility] has a program to fund ramp and she will contact  Full stairs within home has installed railed to basement since his fall and has rail to upstairs    3 story               BED AND BATH ON MAIN, TUB/SHOWER , NO BARS, tub is to the right of toilet    higher toilet in his home; At girlfriends now has higher height toilet    Current assistance/living environment:  Lives alone, S.O., Michell, with pt a lot when not working from her home      Current mobility equipment:  Cane, not using today but has one now  handicap parking    Current ADL equipment:  Button hook, elastic laces, nail clipper board   L custom thumb splint and R; L wrist sock up " "splint   Bottle and can tab openers   Zipper ring pulls   L Saebo glove for fnger/thumb extension   Thumb palmar abd splints   L resting hand splint   Shower bench    Technology used: computer, tends to use minimally and \"hunts and pecks with index fingers\" at baseline    Patient concerns/goals: wants something to keep his L fingers straight as they flex in at night and are very stiff in the morning    Evaluation   Interview completed.   Pain assessment:  Pain denied    Range of motion:  UE: AROM WFL with exception of end range limitations now at shoulders due to kyphotic posture ; L thumb opposition limited due to intrinsic weakness with thumb webspace atrophy; L wrist extensor AROM impaired and finger ext impaired with fingers remaining flexed at in mid position all joints. Able to flex fingers/thumb in 4-/5.R hand can oppose to 3rd digit only due to intrinsic wasting    Manual muscle testing: UE: Shoulders and elbows at least 3/5; Manual  force 4-/5 weak on L, stronger on R    Cognition:   WFL    ADL:   Feeding self:  Cutting food hard due to holding fork on L hand is difficult but has new method to manage and can do Ind  Grooming: Ind  UE Dressing:  Ind Buttons hard with weak L hand, has button hook now  LE Dressing:  Ind has new zipper pull on zipper on jeans, socks harder to pull on due to hand weakness, tying laces is hard, slip on shoes today  Showering/bathing: Ind-has shower chair now  Toileting/transfer:  Ind-higher toilet now, suction cup bar    IADL:   Home management:  S.O. is helping more now  Driving:  NT today: Last visit in OT 8/2021:  Automatic, drives fine per report  Occupation: retired, worked for [employer] in [employer]  Emergency Call system:phone      Fall Risk Screen:   Has the patient fallen 2 or more times in the last year? Yes      Has the patient fallen and had an injury in the past year? Yes, past fall down the  stairs, R LE gave out; Fall 12/22/21 knees gave out and started to " use walker 12/22/21       Timed Up and Go Score: NT today, amb slowly, dyspnea noted, wider based gait with proximal weakness apparent  Notes R LE fasciculations and knee arthritis now that limits stairs  Is the patient a fall risk? Yes     Impairments:  Fatigue  Muscle atrophy  Coordination  Range of motion  Balance  Respiratory compromise     Treatment diagnosis:  Impaired activities of daily living  Impaired IADL    Assessment of Occupational Performance: 3-5 performance deficits  Identified Performance Deficits (ie: feeding, social skills): dressing, eating, home management, toilet transfers, grooming, showering,   Clinical Decision Making (Complexity): Mod complexity     Recommendations/Plan of care:  Patient would benefit from interventions to enhance safety and independence.  Rehab potential good for stated goals.  Occupational therapy intervention for  self care/home management  1 session evaluation & treatment.    Goals:   Target date: today  Patient, family and/or caregiver will verbalize/demonstrate understanding of compensatory methods /equipment to enhance functional independence and safety.  Patient, family and/or caregiver will verbalize/demonstrate understanding of w/c options to reduce fall risk and manage energy due to decline in respiratory function      Educational assessment/barriers to learning:  No barriers noted      Treatment provided this date:   Self care/home management,  15 minutes of training in:  Purpose of transport, manual and powered mobility and features and ability to transport his BiPAP. Measured today with transport w/c requested today at pt request for something lightweight to use for distance. Pt and s.o. were encouraged ton contact [facility] about ramp install as she mentioned program that would cover ramp cost. Training in purpose of powered mobility for disease progression for mobility due to pt respiratory and LE weakness limiting functional mobility. Requested  "outpatient order for power w/c  -measured for transport today to request from [facility]:  Measurements for wheelchair  Hip to knee: 19 inches  Knee to heel: 20\"  Hip to elbow: 8  Hip to underarm: 19  Hip to shoulder: 23  Hip to head: 32\"  Hip width: 17\"  Chest width: 13  Shoulder width: 17    Height: 5' 8\"  Weight: 132#      -training in extended tub bench, sliding extended bench and purpose of tub slide shower commode chair with wheels for disease progression and to aovid consideration of putting in walk-in tub as pt would not have a method to get into it as gets weaker unless has a ceiling lift. Pt's s.o. had not understood this. REquested extended tub bench for pt from [facility] today at he and s.o. request as they declined other types today  -purpose of avoiding resisitve UE exer with home care due to noted fatigue and work on respiratory system. Pt informed this therapist toward end of evaluation that he had home care OT and PT. Emphasized purpose of energy management given his progressive and rapid respiratory decline  S. O. took O2 sats and HR today end of session 92% and  bbm, pt did not have BiPAP with him today      Response to treatment/recommendations: pt receptive.    Goal attainment:  All goals met    Risks and benefits of evaluation/treatment have been explained.  Patient, family and/or caregiver are in agreement with Plan of Care.     Timed Code Treatment Minutes: 15  Total Treatment Time (sum of timed and untimed services): 30 min    Signature: HADLEY Hollins/ANGELO, MSCS   Date: 12/23/21       I CERTIFY THE NEED FOR THESE SERVICES FURNISHED UNDER        THIS PLAN OF TREATMENT AND WHILE UNDER MY CARE     (Physician co-signature of this document indicates review and certification of the therapy plan).                "

## 2021-12-24 NOTE — TELEPHONE ENCOUNTER
Patient was prescribed keflex today by Dr. Wolf to treat his UTI.  It is important that he completes antibiotic course.  Urine cultures were also ordered.  Would like to repeat urine in 1 week to ensure resolution.     1. Urinary tract infection with hematuria, site unspecified  - UA Macro with Reflex to Micro and Culture - lab collect; Future

## 2021-12-24 NOTE — TELEPHONE ENCOUNTER
Reason for Call:  Other     Detailed comments: Provider stated that patient is a part of a research study and that in this study they competed tests and one test came back positive for a uti and the provider wanted the patients provider to be aware because he can not access this results but provider would send via e mail if Dr Jones wants. He just wants to get patient treated for this.    Phone Number   Dr Anish Tan [phone]         Best Time:     Can we leave a detailed message on this number? YES    Call taken on 12/24/2021 at 10:02 AM by Breanna English

## 2021-12-24 NOTE — TELEPHONE ENCOUNTER
Patient significant other alie notified of below, they voiced understanding and agreement.  They reported Dr. Tan did call them too.  They will call back and schedule lab appt later.  Mariaelena Watson RN  [facility]

## 2021-12-24 NOTE — TELEPHONE ENCOUNTER
See message below and Documentation only encounter from today.  pharmacy pended.  Mariaelena Watson RN  [facility]     no

## 2022-01-07 ENCOUNTER — TELEPHONE (OUTPATIENT)
Dept: FAMILY MEDICINE | Facility: CLINIC | Age: 67
End: 2022-01-07
Payer: COMMERCIAL

## 2022-01-10 ENCOUNTER — MEDICAL CORRESPONDENCE (OUTPATIENT)
Dept: HEALTH INFORMATION MANAGEMENT | Facility: CLINIC | Age: 67
End: 2022-01-10
Payer: COMMERCIAL

## 2022-01-11 ENCOUNTER — TELEPHONE (OUTPATIENT)
Dept: FAMILY MEDICINE | Facility: CLINIC | Age: 67
End: 2022-01-11
Payer: COMMERCIAL

## 2022-01-11 NOTE — TELEPHONE ENCOUNTER
Duplicate message POLST completed and faxed. See 12/23/2021 encounter. PT order faxed to [phone]. Sent to scanning.   
Signature needed on PT/DNR, order date 01/05/22.  To Dr. Jones's in basket for signature.  
Staying informed

## 2022-01-17 ENCOUNTER — DOCUMENTATION ONLY (OUTPATIENT)
Dept: OTHER | Facility: CLINIC | Age: 67
End: 2022-01-17
Payer: COMMERCIAL

## 2022-01-18 ENCOUNTER — TELEPHONE (OUTPATIENT)
Dept: FAMILY MEDICINE | Facility: CLINIC | Age: 67
End: 2022-01-18
Payer: COMMERCIAL

## 2022-01-23 ENCOUNTER — HEALTH MAINTENANCE LETTER (OUTPATIENT)
Age: 67
End: 2022-01-23

## 2022-01-25 ENCOUNTER — MEDICAL CORRESPONDENCE (OUTPATIENT)
Dept: HEALTH INFORMATION MANAGEMENT | Facility: CLINIC | Age: 67
End: 2022-01-25
Payer: COMMERCIAL

## 2022-01-26 ENCOUNTER — TELEPHONE (OUTPATIENT)
Dept: FAMILY MEDICINE | Facility: CLINIC | Age: 67
End: 2022-01-26
Payer: COMMERCIAL

## 2022-01-27 ENCOUNTER — TELEPHONE (OUTPATIENT)
Dept: NEUROLOGY | Facility: CLINIC | Age: 67
End: 2022-01-27
Payer: COMMERCIAL

## 2022-01-27 NOTE — TELEPHONE ENCOUNTER
Chucky from home care calling to get a signoff for a face to face for home care for the patient. Dr Jones last saw the patient 4/29/21 and Chucky stated that will not work as that was too long ago. She request a message be sent to Kamari Wolf MD in neurology to see if he would sing off on patient's home care face to face appointment from when the patient was seen 12/23/21.    Upon chart review patient passed away 1/5/22 per neurology addendum on 12/23/21 appointment.  Nurse called Chucky back and informed her of this. She said she was able to get the ordering provider to sign off on the documentation and nothing further is needed.     Thank you,  Dafne Baker RN  [facility], [place]

## 2022-01-31 ENCOUNTER — TELEPHONE (OUTPATIENT)
Dept: FAMILY MEDICINE | Facility: CLINIC | Age: 67
End: 2022-01-31
Payer: COMMERCIAL

## 2022-02-02 ENCOUNTER — DOCUMENTATION ONLY (OUTPATIENT)
Dept: OTHER | Facility: CLINIC | Age: 67
End: 2022-02-02
Payer: COMMERCIAL

## 2022-02-03 ENCOUNTER — TELEPHONE (OUTPATIENT)
Dept: FAMILY MEDICINE | Facility: CLINIC | Age: 67
End: 2022-02-03
Payer: COMMERCIAL

## 2022-02-03 NOTE — TELEPHONE ENCOUNTER
Signature needed on discharge order, order date 1/13/22.    To Dr. Jones's in basket for signature.

## 2022-02-10 ENCOUNTER — MEDICAL CORRESPONDENCE (OUTPATIENT)
Dept: HEALTH INFORMATION MANAGEMENT | Facility: CLINIC | Age: 67
End: 2022-02-10
Payer: COMMERCIAL

## 2022-02-11 ENCOUNTER — TELEPHONE (OUTPATIENT)
Dept: FAMILY MEDICINE | Facility: CLINIC | Age: 67
End: 2022-02-11
Payer: COMMERCIAL

## 2022-02-11 NOTE — TELEPHONE ENCOUNTER
Forms received from: [facility]    Phone number listed: [phone]    Fax listed: [phone]  Date received: 02/11/2022  Form description: discharge order  Once forms are completed, please return to [facility]  via fax.  Form placed: in providers in corinna English

## 2022-06-13 ENCOUNTER — TELEPHONE (OUTPATIENT)
Dept: NEUROLOGY | Facility: CLINIC | Age: 67
End: 2022-06-13
Payer: COMMERCIAL

## 2022-06-13 NOTE — TELEPHONE ENCOUNTER
[facility] Call [facility]    Phone Message    May a detailed message be left on voicemail: yes     Reason for Call:  Michell called asking to speak to provider or Neelam. Michell is asking for providers e-mail and would like to ask some questions regarding ALS. Please call back at [phone], Michell can b e reached at any time.    Action Taken: Message routed to:  [facility] ([facility] neurology    Travel Screening: Not Applicable

## (undated) DEVICE — PREP CHLORAPREP 26ML TINTED ORANGE  260815

## (undated) DEVICE — SOL WATER IRRIG 1000ML BOTTLE 07139-09

## (undated) RX ORDER — SIMETHICONE 40MG/0.6ML
SUSPENSION, DROPS(FINAL DOSAGE FORM)(ML) ORAL
Status: DISPENSED
Start: 2019-12-30

## (undated) RX ORDER — FENTANYL CITRATE 50 UG/ML
INJECTION, SOLUTION INTRAMUSCULAR; INTRAVENOUS
Status: DISPENSED
Start: 2019-12-30